# Patient Record
Sex: FEMALE | Race: BLACK OR AFRICAN AMERICAN | NOT HISPANIC OR LATINO | Employment: FULL TIME | URBAN - METROPOLITAN AREA
[De-identification: names, ages, dates, MRNs, and addresses within clinical notes are randomized per-mention and may not be internally consistent; named-entity substitution may affect disease eponyms.]

---

## 2023-12-16 NOTE — PROGRESS NOTES
Subjective   Josselyn Mcarthur is a 28 y.o.  at 9w1d with a working estimated date of delivery of 2024, by Last Menstrual Period who presents for an initial prenatal visit.     Patient currently experiencing:  nausea, not vomited yet, she eat and her symptoms normally improve, would like medication    Bleeding or cramping since LMP: no    Ultrasound completed this pregnancy: No    Taking prenatal vitamin: No, amenable to rx being sent    Last pap: 3/3/22 WNL     Patient has a hx of cHTN; was on amlodipine 5mg daily last pregnancy. Not currently on medicaiton.     Postpartum Depression: Not on file        OB History    Para Term  AB Living   2 1 1     1   SAB IAB Ectopic Multiple Live Births           1      # Outcome Date GA Lbr José Miguel/2nd Weight Sex Delivery Anes PTL Lv   2 Current            1 Term 22 38w3d   F CS-LTranv   ABA      Complications: Hemorrhage     Prior pregnancy complications:  none  History of hypertension:  Yes, chronic hypertension    Past Medical History:   Diagnosis Date    Asthma     well controlled, denies hospitalization/intubation    Chronic hypertension       Past Surgical History:   Procedure Laterality Date     SECTION, LOW TRANSVERSE  2022    NRFHT remote from delivery      Social History     Tobacco Use    Smoking status: Never    Smokeless tobacco: Never   Vaping Use    Vaping Use: Never used   Substance Use Topics    Alcohol use: Not Currently    Drug use: Not Currently     Types: Marijuana        Objective   Physical Exam  Weight: 122 kg (268 lb)  Pregravid BMI: 43.28  BP: 129/83 (pluse-102)    Physical Exam  Constitutional:       Appearance: Normal appearance.   HENT:      Head: Normocephalic.   Cardiovascular:      Rate and Rhythm: Normal rate and regular rhythm.      Heart sounds: Normal heart sounds.   Pulmonary:      Effort: Pulmonary effort is normal.      Breath sounds: Normal breath sounds.   Chest:   Breasts:     Right: Normal.      Left:  Normal.   Genitourinary:     Vagina: Normal.      Cervix: Normal.   Skin:     General: Skin is warm and dry.   Neurological:      Mental Status: She is alert.   Psychiatric:         Mood and Affect: Mood normal.         Behavior: Behavior normal.         Thought Content: Thought content normal.         Judgment: Judgment normal.         Problem List Items Addressed This Visit       BMI 40.0-44.9, adult (CMS/[facility])    Overview     Pre-preg BMI 43  A1C at new OB:   Fetal surveillance BMI >40 at NOB:  Growth US at 30 and 36 wks  BPP or NST weekly 34 wks to delivery    Timing of delivery: 39 0/7 - 39 6/7 wks  *BMI >= 50 at any time in pregnancy: Deliver at Level 4           Supervision of other high risk pregnancy, antepartum    Overview     Desired provider in labor: [] CNM  [] Physician  [] Dated by:   [x] Initial BMI: 43  [] Prenatal Labs:   [] Rh status:  [] Genetic Screening:    [] Baby ASA    [] Anatomy US:  [] Fetal Sex:   [] Patient added to BirthTracks  [] 1hr GCT at 24-28wks:  [] 3 hr GTT (if indicated):  [] Rhogam (if indicated):   [] Fetal Surveillance (if indicated):    [] Tdap (27-36wks):  [] Flu Shot:  [] COVID vaccine:     [] Breastfeeding:  [] Pain management during labor:   [] Postpartum Birth control method:     [] GBS at 36 wks:  [] 39 weeks discussion of IOL vs. Expectant management:  [] Mode of delivery:              Chronic hypertension    Overview     cHTN was on on amlodipine 5mg daily with  birth  HELLP labs ordered at new OB  Normotensive    Growth US at 30 and 36 wks  Timing of delivery: 38 0/7 - 39 6/7  Expectant management beyond 39 0/7 weeks only with careful consideration of risks/benefits & with appropriate surveillance           History of postpartum hemorrhage    Overview     : 1850ml EBL, TXA/cyto          Hx of  section    Overview     LTCS  due to NRFHT remote from birth           Other Visit Diagnoses       9 weeks gestation of pregnancy    -  Primary    Relevant  Medications    pyridoxine (Vitamin B-6) 25 mg tablet    doxylamine (Unisom, doxylamine,) 25 mg tablet    prenatal vitamin, iron-folic, 27 mg iron-800 mcg folic acid tablet    Other Relevant Orders    C. Trachomatis / N. Gonorrhoeae, Amplified Detection    CBC Anemia Panel With Reflex,Pregnancy    Hemoglobin A1C    Hemoglobin Identification with Path Review    Hepatitis B Surface Antigen    Hepatitis C Antibody    HIV 1/2 Antigen/Antibody Screen with Reflex to Confirmation    Rubella Antibody, IgG    Syphilis Screen with Reflex    Trichomonas vaginalis, Nucleic Acid Detection    Type And Screen    Urine Culture    Varicella Zoster Antibody, IgG    Comprehensive Metabolic Panel    Protein, Urine Random    Uric Acid    US MAC OB imaging order            Plan   NOB plan: New OB resources provided and reviewed with particular attention to dietary, travel, and medication restrictions  Oriented to practice, CNM vs. MD care  Reviewed IOM recommendations for weight gain given pt's BMI: 11-20 pounds (BMI greater than or equal to 30)  Reviewed bleeding precautions, warning signs, when to call provider; phone number provided  Discussed bASA for PEC prophylaxis  The following Rx were sent to pharmacy: PNV, unisom, B6  Routine NOB labs ordered  Additional labs added: HELLP and A1C   Discussed Centering Pregnancy with patient, patient will think about it   Nuchal Translucency ultrasound ordered  Return in 4 weeks for routine prenatal care  Reviewed reasons to call CNM on-call: vaginal bleeding, strong pelvic pain, or any questions/concerns  *next visit: ASA 81mg, Centering?     PEPE Vuong

## 2023-12-18 ENCOUNTER — LAB (OUTPATIENT)
Dept: LAB | Facility: LAB | Age: 28
End: 2023-12-18
Payer: MEDICAID

## 2023-12-18 ENCOUNTER — ROUTINE PRENATAL (OUTPATIENT)
Dept: OBSTETRICS AND GYNECOLOGY | Facility: CLINIC | Age: 28
End: 2023-12-18
Payer: MEDICAID

## 2023-12-18 VITALS — SYSTOLIC BLOOD PRESSURE: 129 MMHG | WEIGHT: 268 LBS | BODY MASS INDEX: 43.26 KG/M2 | DIASTOLIC BLOOD PRESSURE: 83 MMHG

## 2023-12-18 DIAGNOSIS — I10 CHRONIC HYPERTENSION: ICD-10-CM

## 2023-12-18 DIAGNOSIS — Z87.59 HISTORY OF POSTPARTUM HEMORRHAGE: ICD-10-CM

## 2023-12-18 DIAGNOSIS — Z3A.09 9 WEEKS GESTATION OF PREGNANCY (HHS-HCC): ICD-10-CM

## 2023-12-18 DIAGNOSIS — Z98.891 HX OF CESAREAN SECTION: ICD-10-CM

## 2023-12-18 DIAGNOSIS — O09.899 SUPERVISION OF OTHER HIGH RISK PREGNANCY, ANTEPARTUM (HHS-HCC): ICD-10-CM

## 2023-12-18 DIAGNOSIS — Z3A.09 9 WEEKS GESTATION OF PREGNANCY (HHS-HCC): Primary | ICD-10-CM

## 2023-12-18 LAB
ABO GROUP (TYPE) IN BLOOD: NORMAL
ALBUMIN SERPL BCP-MCNC: 4.3 G/DL (ref 3.4–5)
ALP SERPL-CCNC: 48 U/L (ref 33–110)
ALT SERPL W P-5'-P-CCNC: 18 U/L (ref 7–45)
ANION GAP SERPL CALC-SCNC: 12 MMOL/L (ref 10–20)
ANTIBODY SCREEN: NORMAL
AST SERPL W P-5'-P-CCNC: 9 U/L (ref 9–39)
BILIRUB SERPL-MCNC: 0.3 MG/DL (ref 0–1.2)
BUN SERPL-MCNC: 12 MG/DL (ref 6–23)
CALCIUM SERPL-MCNC: 9.6 MG/DL (ref 8.6–10.6)
CHLORIDE SERPL-SCNC: 105 MMOL/L (ref 98–107)
CO2 SERPL-SCNC: 24 MMOL/L (ref 21–32)
CREAT SERPL-MCNC: 0.63 MG/DL (ref 0.5–1.05)
ERYTHROCYTE [DISTWIDTH] IN BLOOD BY AUTOMATED COUNT: 15.4 % (ref 11.5–14.5)
EST. AVERAGE GLUCOSE BLD GHB EST-MCNC: 114 MG/DL
GFR SERPL CREATININE-BSD FRML MDRD: >90 ML/MIN/1.73M*2
GLUCOSE SERPL-MCNC: 92 MG/DL (ref 74–99)
HBA1C MFR BLD: 5.6 %
HBV SURFACE AG SERPL QL IA: NONREACTIVE
HCT VFR BLD AUTO: 35.9 % (ref 36–46)
HCV AB SER QL: NONREACTIVE
HGB BLD-MCNC: 11.4 G/DL (ref 12–16)
HIV 1+2 AB+HIV1 P24 AG SERPL QL IA: NONREACTIVE
MCH RBC QN AUTO: 26.1 PG (ref 26–34)
MCHC RBC AUTO-ENTMCNC: 31.8 G/DL (ref 32–36)
MCV RBC AUTO: 82 FL (ref 80–100)
NRBC BLD-RTO: 0 /100 WBCS (ref 0–0)
PLATELET # BLD AUTO: 300 X10*3/UL (ref 150–450)
POTASSIUM SERPL-SCNC: 4 MMOL/L (ref 3.5–5.3)
PROT SERPL-MCNC: 7.4 G/DL (ref 6.4–8.2)
RBC # BLD AUTO: 4.36 X10*6/UL (ref 4–5.2)
REFLEX ADDED, ANEMIA PANEL: NORMAL
RH FACTOR (ANTIGEN D): NORMAL
RUBV IGG SERPL IA-ACNC: 1.7 IA
RUBV IGG SERPL QL IA: POSITIVE
SODIUM SERPL-SCNC: 137 MMOL/L (ref 136–145)
T PALLIDUM AB SER QL: NONREACTIVE
URATE SERPL-MCNC: 5.2 MG/DL (ref 2.3–6.7)
VARICELLA ZOSTER IGG INDEX: 0.2 IA
VZV IGG SER QL IA: NEGATIVE
WBC # BLD AUTO: 8.7 X10*3/UL (ref 4.4–11.3)

## 2023-12-18 PROCEDURE — 80053 COMPREHEN METABOLIC PANEL: CPT

## 2023-12-18 PROCEDURE — 86787 VARICELLA-ZOSTER ANTIBODY: CPT

## 2023-12-18 PROCEDURE — 83020 HEMOGLOBIN ELECTROPHORESIS: CPT | Performed by: ADVANCED PRACTICE MIDWIFE

## 2023-12-18 PROCEDURE — 36415 COLL VENOUS BLD VENIPUNCTURE: CPT

## 2023-12-18 PROCEDURE — 99214 OFFICE O/P EST MOD 30 MIN: CPT | Mod: TH | Performed by: ADVANCED PRACTICE MIDWIFE

## 2023-12-18 PROCEDURE — 86317 IMMUNOASSAY INFECTIOUS AGENT: CPT

## 2023-12-18 PROCEDURE — 87661 TRICHOMONAS VAGINALIS AMPLIF: CPT | Mod: 91 | Performed by: ADVANCED PRACTICE MIDWIFE

## 2023-12-18 PROCEDURE — 86850 RBC ANTIBODY SCREEN: CPT

## 2023-12-18 PROCEDURE — 87086 URINE CULTURE/COLONY COUNT: CPT | Performed by: ADVANCED PRACTICE MIDWIFE

## 2023-12-18 PROCEDURE — 86901 BLOOD TYPING SEROLOGIC RH(D): CPT

## 2023-12-18 PROCEDURE — 84550 ASSAY OF BLOOD/URIC ACID: CPT

## 2023-12-18 PROCEDURE — 83036 HEMOGLOBIN GLYCOSYLATED A1C: CPT

## 2023-12-18 PROCEDURE — 85027 COMPLETE CBC AUTOMATED: CPT

## 2023-12-18 PROCEDURE — 86780 TREPONEMA PALLIDUM: CPT

## 2023-12-18 PROCEDURE — 99214 OFFICE O/P EST MOD 30 MIN: CPT | Performed by: ADVANCED PRACTICE MIDWIFE

## 2023-12-18 PROCEDURE — 86900 BLOOD TYPING SEROLOGIC ABO: CPT

## 2023-12-18 PROCEDURE — 87389 HIV-1 AG W/HIV-1&-2 AB AG IA: CPT

## 2023-12-18 PROCEDURE — 86803 HEPATITIS C AB TEST: CPT

## 2023-12-18 PROCEDURE — 87800 DETECT AGNT MULT DNA DIREC: CPT | Performed by: ADVANCED PRACTICE MIDWIFE

## 2023-12-18 PROCEDURE — 87340 HEPATITIS B SURFACE AG IA: CPT

## 2023-12-18 PROCEDURE — 83021 HEMOGLOBIN CHROMOTOGRAPHY: CPT

## 2023-12-18 RX ORDER — PYRIDOXINE HCL (VITAMIN B6) 25 MG
25 TABLET ORAL 3 TIMES DAILY PRN
Qty: 30 TABLET | Refills: 2 | Status: SHIPPED | OUTPATIENT
Start: 2023-12-18 | End: 2024-01-17

## 2023-12-18 ASSESSMENT — ENCOUNTER SYMPTOMS
CONSTITUTIONAL NEGATIVE: 0
GASTROINTESTINAL NEGATIVE: 0
CARDIOVASCULAR NEGATIVE: 0
MUSCULOSKELETAL NEGATIVE: 0
HEMATOLOGIC/LYMPHATIC NEGATIVE: 0
EYES NEGATIVE: 0
ENDOCRINE NEGATIVE: 0
RESPIRATORY NEGATIVE: 0
NEUROLOGICAL NEGATIVE: 0
ALLERGIC/IMMUNOLOGIC NEGATIVE: 0
PSYCHIATRIC NEGATIVE: 0

## 2023-12-19 PROBLEM — Z28.39 MATERNAL VARICELLA, NON-IMMUNE (HHS-HCC): Status: ACTIVE | Noted: 2023-12-19

## 2023-12-19 PROBLEM — O09.899 MATERNAL VARICELLA, NON-IMMUNE (HHS-HCC): Status: ACTIVE | Noted: 2023-12-19

## 2023-12-19 LAB
BACTERIA UR CULT: NO GROWTH
C TRACH RRNA SPEC QL NAA+PROBE: NEGATIVE
HEMOGLOBIN A2: 2.5 % (ref 2–3.5)
HEMOGLOBIN A: 97.1 % (ref 95.8–98)
HEMOGLOBIN F: 0.4 % (ref 0–2)
HEMOGLOBIN IDENTIFICATION INTERPRETATION: NORMAL
N GONORRHOEA DNA SPEC QL PROBE+SIG AMP: NEGATIVE
PATH REVIEW-HGB IDENTIFICATION: NORMAL
T VAGINALIS RRNA SPEC QL NAA+PROBE: NEGATIVE

## 2024-01-12 ENCOUNTER — DOCUMENTATION (OUTPATIENT)
Dept: OBSTETRICS AND GYNECOLOGY | Facility: CLINIC | Age: 29
End: 2024-01-12
Payer: MEDICAID

## 2024-01-12 ENCOUNTER — PROCEDURE VISIT (OUTPATIENT)
Dept: RADIOLOGY | Facility: CLINIC | Age: 29
End: 2024-01-12
Payer: MEDICAID

## 2024-01-12 DIAGNOSIS — Z3A.09 9 WEEKS GESTATION OF PREGNANCY (HHS-HCC): ICD-10-CM

## 2024-01-12 PROCEDURE — 76813 OB US NUCHAL MEAS 1 GEST: CPT

## 2024-01-13 NOTE — PROGRESS NOTES
Subjective   Ora Beth Mcarthur is a 28 y.o.  at 12w3d with a working estimated date of delivery of 2024, by Ultrasound who presents for a routine prenatal visit.     She denies vaginal bleeding, abdominal pain, leakage of fluid.     Patient declines genetic testing. Patient declines flu shot. Declines Centering.     Objective   Physical Exam  Weight: 123 kg (271 lb 9.6 oz), Pregravid BMI: 43.28  Expected Total Weight Gain: 5 kg (11 lb)-9 kg (19 lb)   BP: 121/80 (Pluse-105)  Fetal Heart Rate: 160      Problem List Items Addressed This Visit       BMI 40.0-44.9, adult (CMS/[facility])    Overview     Pre-preg BMI 43  A1C at new OB:   Fetal surveillance BMI >40 at NOB:  Growth US at 30 and 36 wks  BPP or NST weekly 34 wks to delivery    Timing of delivery: 39 0/7 - 39 6/7 wks  *BMI >= 50 at any time in pregnancy: Deliver at Level 4           Supervision of other high risk pregnancy, antepartum    Overview     Desired provider in labor: [] CNM  [] Physician  [x] Dated by:  12w US  [x] Initial BMI: 43  [x] Prenatal Labs:  WNL except varicella nonimmue  [x] Rh status: B+  [x] Genetic Screening:  declined   [x] Baby ASA: prescribed 1/15    [] Anatomy US:  [] Fetal Sex:   [] Patient added to BirthTracks  [] 1hr GCT at 24-28wks:  [] 3 hr GTT (if indicated):  [] Fetal Surveillance (if indicated):    [] Tdap (27-36wks):  [] Flu Shot:  [] COVID vaccine:     [] Breastfeeding:  [] Pain management during labor:   [] Postpartum Birth control method:     [] GBS at 36 wks:  [] 39 weeks discussion of IOL vs. Expectant management:  [] Mode of delivery:              History of postpartum hemorrhage    Overview     : 1850ml EBL, TXA/cyto          Hx of  section    Overview     LTCS  due to NRFHT remote from birth          Maternal varicella, non-immune    Overview     Offer varivax PP          Other Visit Diagnoses       12 weeks gestation of pregnancy    -  Primary    Relevant Medications    aspirin 81 mg chewable  tablet    Influenza vaccination declined                -Start bASA for PEC prophylaxis  -Anatomy US ordered  -Reviewed reasons to call CNM on-call: vaginal bleeding, loss of fluid, severe pelvic pain, or any questions/concerns  -RTC in 4 weeks or prn    TATE Vuong-SALVADORM

## 2024-01-15 ENCOUNTER — ROUTINE PRENATAL (OUTPATIENT)
Dept: OBSTETRICS AND GYNECOLOGY | Facility: CLINIC | Age: 29
End: 2024-01-15
Payer: MEDICAID

## 2024-01-15 VITALS — WEIGHT: 271.6 LBS | BODY MASS INDEX: 43.84 KG/M2 | DIASTOLIC BLOOD PRESSURE: 80 MMHG | SYSTOLIC BLOOD PRESSURE: 121 MMHG

## 2024-01-15 DIAGNOSIS — Z98.891 HX OF CESAREAN SECTION: ICD-10-CM

## 2024-01-15 DIAGNOSIS — Z87.59 HISTORY OF POSTPARTUM HEMORRHAGE: ICD-10-CM

## 2024-01-15 DIAGNOSIS — Z28.21 INFLUENZA VACCINATION DECLINED: ICD-10-CM

## 2024-01-15 DIAGNOSIS — Z3A.12 12 WEEKS GESTATION OF PREGNANCY (HHS-HCC): Primary | ICD-10-CM

## 2024-01-15 DIAGNOSIS — O09.899 SUPERVISION OF OTHER HIGH RISK PREGNANCY, ANTEPARTUM (HHS-HCC): ICD-10-CM

## 2024-01-15 DIAGNOSIS — Z28.39 MATERNAL VARICELLA, NON-IMMUNE (HHS-HCC): ICD-10-CM

## 2024-01-15 DIAGNOSIS — O09.899 MATERNAL VARICELLA, NON-IMMUNE (HHS-HCC): ICD-10-CM

## 2024-01-15 PROCEDURE — 99213 OFFICE O/P EST LOW 20 MIN: CPT | Performed by: ADVANCED PRACTICE MIDWIFE

## 2024-01-15 PROCEDURE — 99213 OFFICE O/P EST LOW 20 MIN: CPT | Mod: TH | Performed by: ADVANCED PRACTICE MIDWIFE

## 2024-01-15 RX ORDER — NAPROXEN SODIUM 220 MG/1
81 TABLET, FILM COATED ORAL NIGHTLY
Qty: 90 TABLET | Refills: 3 | Status: SHIPPED | OUTPATIENT
Start: 2024-01-15 | End: 2024-04-14

## 2024-01-15 ASSESSMENT — ENCOUNTER SYMPTOMS
ALLERGIC/IMMUNOLOGIC NEGATIVE: 0
HEMATOLOGIC/LYMPHATIC NEGATIVE: 0
ENDOCRINE NEGATIVE: 0
MUSCULOSKELETAL NEGATIVE: 0
PSYCHIATRIC NEGATIVE: 0
CONSTITUTIONAL NEGATIVE: 0
NEUROLOGICAL NEGATIVE: 0
GASTROINTESTINAL NEGATIVE: 0
EYES NEGATIVE: 0
CARDIOVASCULAR NEGATIVE: 0
RESPIRATORY NEGATIVE: 0

## 2024-02-13 ENCOUNTER — ROUTINE PRENATAL (OUTPATIENT)
Dept: OBSTETRICS AND GYNECOLOGY | Facility: CLINIC | Age: 29
End: 2024-02-13
Payer: MEDICAID

## 2024-02-13 VITALS — DIASTOLIC BLOOD PRESSURE: 84 MMHG | SYSTOLIC BLOOD PRESSURE: 131 MMHG | BODY MASS INDEX: 43.45 KG/M2 | WEIGHT: 269.2 LBS

## 2024-02-13 DIAGNOSIS — Z3A.16 16 WEEKS GESTATION OF PREGNANCY (HHS-HCC): Primary | ICD-10-CM

## 2024-02-13 DIAGNOSIS — Z98.891 HX OF CESAREAN SECTION: ICD-10-CM

## 2024-02-13 DIAGNOSIS — Z87.59 HISTORY OF POSTPARTUM HEMORRHAGE: ICD-10-CM

## 2024-02-13 DIAGNOSIS — I10 CHRONIC HYPERTENSION: ICD-10-CM

## 2024-02-13 LAB
CREAT UR-MCNC: 189.1 MG/DL (ref 20–320)
PROT UR-ACNC: 14 MG/DL (ref 5–24)
PROT/CREAT UR: 0.07 MG/MG CREAT (ref 0–0.17)

## 2024-02-13 PROCEDURE — 99214 OFFICE O/P EST MOD 30 MIN: CPT | Mod: TH | Performed by: ADVANCED PRACTICE MIDWIFE

## 2024-02-13 PROCEDURE — 82570 ASSAY OF URINE CREATININE: CPT | Performed by: ADVANCED PRACTICE MIDWIFE

## 2024-02-13 PROCEDURE — 99214 OFFICE O/P EST MOD 30 MIN: CPT | Performed by: ADVANCED PRACTICE MIDWIFE

## 2024-02-13 ASSESSMENT — ENCOUNTER SYMPTOMS
CONSTITUTIONAL NEGATIVE: 0
PSYCHIATRIC NEGATIVE: 0
GASTROINTESTINAL NEGATIVE: 0
RESPIRATORY NEGATIVE: 0
HEMATOLOGIC/LYMPHATIC NEGATIVE: 0
MUSCULOSKELETAL NEGATIVE: 0
ENDOCRINE NEGATIVE: 0
ALLERGIC/IMMUNOLOGIC NEGATIVE: 0
NEUROLOGICAL NEGATIVE: 0
CARDIOVASCULAR NEGATIVE: 0
EYES NEGATIVE: 0

## 2024-02-13 NOTE — PROGRESS NOTES
Assessment/Plan   Problem List Items Addressed This Visit             ICD-10-CM       Gynecologic and Obstetric Problems    History of postpartum hemorrhage Z87.59    Hx of  section Z98.891       Other    BMI 40.0-44.9, adult (CMS/[facility]) Z68.41    Chronic hypertension I10    Relevant Orders    Protein, Urine Random     Other Visit Diagnoses         Codes    16 weeks gestation of pregnancy    -  Primary Z3A.16            Labs reviewed.  Anatomy US scheduled  Taking baby asa BP cuff at home  Declined genetics    Reviewed s/sx of PTL, warning signs, fetal movement counts, and when to call provider  Follow up in 4 weeks for a routine prenatal visit.    PEPE Gonsales    Subjective     Orrios Beth Mcarthur is a 28 y.o.  at 16w4d with a working estimated date of delivery of 2024, by Ultrasound who presents for a routine prenatal visit. She denies vaginal bleeding, leakage of fluid, decreased fetal movements, or contractions.    Her pregnancy is complicated by:  Pregnancy Problems (from 23 to present)       Problem Noted Resolved    Maternal varicella, non-immune 2023 by PEPE Vuong No    Overview Signed 2023  8:05 AM by PEPE Vuong     Offer varivax PP         BMI 40.0-44.9, adult (CMS/[facility]) 2023 by PEPE Vuong No    Overview Addendum 2023  3:18 PM by PEPE Vuong     Pre-preg BMI 43  A1C at new OB:   Fetal surveillance BMI >40 at NOB:  Growth US at 30 and 36 wks  BPP or NST weekly 34 wks to delivery    Timing of delivery: 39 0/7 - 39 6/7 wks  *BMI >= 50 at any time in pregnancy: Deliver at Level 4           Supervision of other high risk pregnancy, antepartum 2023 by PEPE Vuong No    Overview Addendum 1/15/2024  1:18 PM by PEPE Vuong     Desired provider in labor: [] CNM  [] Physician  [x] Dated by:  12w US  [x] Initial BMI:  "43  [x] Prenatal Labs:  WNL except varicella nonimmue  [x] Rh status: B+  [x] Genetic Screening:  declined   [x] Baby ASA: prescribed 1/15    [] Anatomy US:  [] Fetal Sex:   [] Patient added to BirthTracks  [] 1hr GCT at 24-28wks:  [] 3 hr GTT (if indicated):  [] Fetal Surveillance (if indicated):    [] Tdap (27-36wks):  [x] Flu Shot: declined 1/15  [] COVID vaccine:     [] Breastfeeding:  [] Pain management during labor:   [] Postpartum Birth control method:     [] GBS at 36 wks:  [] 39 weeks discussion of IOL vs. Expectant management:  [] Mode of delivery:              History of postpartum hemorrhage 2023 by PEPE Vuong No    Overview Signed 2023  3:55 PM by PEPE Vuong     : 1850ml EBL, TXA/cyto          Hx of  section 2023 by PEPE Vuong No    Overview Signed 2023  3:56 PM by PEPE Vuong     LTCS  due to NRFHT remote from birth                   Objective   Physical Exam  Weight: 122 kg (269 lb 3.2 oz)  Expected Total Weight Gain: 5 kg (11 lb)-9 kg (19 lb)   Pregravid BMI: 43.28  BP: 131/84 (Pluse-92)  Fetal Heart Rate: 165      Postpartum Depression: Not on file       Prenatal Labs  Lab Results   Component Value Date    HGB 11.4 (L) 2023    HCT 35.9 (L) 2023    ABO B 2023    HEPBSAG Nonreactive 2023     No results found for: \"GLUC1P\", \"GL3\"     Imaging  The most recent ultrasound was performed on The most recent ultrasound study is not finalized with a study GA of The most recent ultrasound study is not finalized and EFW of The most recent ultrasound study is not finalized.  The most recent ultrasound study is not finalized  The most recent ultrasound study is not finalized  "

## 2024-02-28 ENCOUNTER — TELEPHONE (OUTPATIENT)
Dept: OBSTETRICS AND GYNECOLOGY | Facility: CLINIC | Age: 29
End: 2024-02-28

## 2024-03-08 ENCOUNTER — HOSPITAL ENCOUNTER (OUTPATIENT)
Dept: RADIOLOGY | Facility: CLINIC | Age: 29
Discharge: HOME | End: 2024-03-08
Payer: MEDICAID

## 2024-03-08 DIAGNOSIS — Z36.3 ENCOUNTER FOR ANTENATAL SCREENING FOR MALFORMATIONS (HHS-HCC): ICD-10-CM

## 2024-03-08 PROCEDURE — 76811 OB US DETAILED SNGL FETUS: CPT | Performed by: OBSTETRICS & GYNECOLOGY

## 2024-03-08 PROCEDURE — 76811 OB US DETAILED SNGL FETUS: CPT

## 2024-03-14 ENCOUNTER — ROUTINE PRENATAL (OUTPATIENT)
Dept: OBSTETRICS AND GYNECOLOGY | Facility: CLINIC | Age: 29
End: 2024-03-14
Payer: MEDICAID

## 2024-03-14 VITALS — DIASTOLIC BLOOD PRESSURE: 84 MMHG | WEIGHT: 275.4 LBS | BODY MASS INDEX: 44.45 KG/M2 | SYSTOLIC BLOOD PRESSURE: 134 MMHG

## 2024-03-14 DIAGNOSIS — Z87.59 HISTORY OF POSTPARTUM HEMORRHAGE: ICD-10-CM

## 2024-03-14 DIAGNOSIS — O09.899 SUPERVISION OF OTHER HIGH RISK PREGNANCY, ANTEPARTUM (HHS-HCC): ICD-10-CM

## 2024-03-14 DIAGNOSIS — O09.899 MATERNAL VARICELLA, NON-IMMUNE (HHS-HCC): ICD-10-CM

## 2024-03-14 DIAGNOSIS — Z28.39 MATERNAL VARICELLA, NON-IMMUNE (HHS-HCC): ICD-10-CM

## 2024-03-14 DIAGNOSIS — Z3A.20 20 WEEKS GESTATION OF PREGNANCY (HHS-HCC): Primary | ICD-10-CM

## 2024-03-14 DIAGNOSIS — Z98.891 HX OF CESAREAN SECTION: ICD-10-CM

## 2024-03-14 PROCEDURE — 99213 OFFICE O/P EST LOW 20 MIN: CPT | Performed by: ADVANCED PRACTICE MIDWIFE

## 2024-03-14 PROCEDURE — 99213 OFFICE O/P EST LOW 20 MIN: CPT | Mod: TH | Performed by: ADVANCED PRACTICE MIDWIFE

## 2024-03-14 NOTE — PROGRESS NOTES
Assessment/Plan   Problem List Items Addressed This Visit             ICD-10-CM       Ob-Gyn Problems    History of postpartum hemorrhage Z87.59    Hx of  section Z98.891    Maternal varicella, non-immune O09.899, Z28.39    Supervision of other high risk pregnancy, antepartum O09.899       Other    BMI 40.0-44.9, adult (CMS/[facility]) Z68.41     Other Visit Diagnoses         Codes    20 weeks gestation of pregnancy    -  Primary Z3A.20          Labs reviewed.  Anatomy US scheduled  Growth US scheduled for 30WK  NEED MD CONSULT TO TOLAC    Reviewed s/sx of PTL, warning signs, fetal movement counts, and when to call provider  Follow up in 4 weeks for a routine prenatal visit.    PEPE Gonsales     Josselyn Mcarthur is a 28 y.o.  at 20w6d with a working estimated date of delivery of 2024, by Ultrasound who presents for a routine prenatal visit. She denies vaginal bleeding, leakage of fluid, decreased fetal movements, or contractions.    Her pregnancy is complicated by:  Pregnancy Problems (from 23 to present)       Problem Noted Resolved    Maternal varicella, non-immune 2023 by PEPE Vuong No    Priority:  Medium      Overview Signed 2023  8:05 AM by PEPE Vuong     Offer varivax PP         BMI 40.0-44.9, adult (CMS/[facility]) 2023 by PEPE Vuong No    Priority:  Medium      Overview Addendum 2023  3:18 PM by PEPE Vuong     Pre-preg BMI 43  A1C at new OB:   Fetal surveillance BMI >40 at NOB:  Growth US at 30 and 36 wks  BPP or NST weekly 34 wks to delivery    Timing of delivery: 39 0/7 - 39 6/7 wks  *BMI >= 50 at any time in pregnancy: Deliver at Level 4           Supervision of other high risk pregnancy, antepartum 2023 by PEPE Vuong No    Priority:  Medium      Overview Addendum 1/15/2024  1:18 PM by PEPE Vuong      "Desired provider in labor: [x] CNM  [] Physician  [x] Dated by:  12w US  [x] Initial BMI: 43  [x] Prenatal Labs:  WNL except varicella nonimmue  [x] Rh status: B+  [x] Genetic Screening:  declined   [x] Baby ASA: prescribed 1/15    [x] Anatomy US: incomplete  [x] Fetal Sex: girl  [] Patient added to BirthTracks  [] 1hr GCT at 24-28wks:  [] 3 hr GTT (if indicated):  [] Fetal Surveillance (if indicated):    [] Tdap (27-36wks):  [x] Flu Shot: declined 1/15  [] COVID vaccine:     [] Breastfeeding:  [] Pain management during labor:   [] Postpartum Birth control method:     [] GBS at 36 wks:  [] 39 weeks discussion of IOL vs. Expectant management:  [] Mode of delivery:              History of postpartum hemorrhage 2023 by PEPE Vuong No    Priority:  Medium      Overview Signed 2023  3:55 PM by PEPE Vuong     : 1850ml EBL, TXA/cyto          Hx of  section 2023 by PEPE Vuong No    Priority:  Medium      Overview Addendum 2024 12:08 PM by PEPE Gonsales     LTCS  due to NRFHT remote from birth     MFMU 48.6% NEEDS MD consult based on 268lb pre-preg weight                  Objective   Physical Exam  Weight: 125 kg (275 lb 6.4 oz)  Expected Total Weight Gain: 5 kg (11 lb)-9 kg (19 lb)   Pregravid BMI: 43.28  BP: 134/84 ()  Fetal Heart Rate: 155      Postpartum Depression: Not on file       Prenatal Labs  Lab Results   Component Value Date    HGB 11.4 (L) 2023    HCT 35.9 (L) 2023    ABO B 2023    HEPBSAG Nonreactive 2023     No results found for: \"GLUC1P\", \"GL3\"     Imaging  The most recent ultrasound was performed on The most recent ultrasound study is not finalized with a study GA of The most recent ultrasound study is not finalized and EFW of The most recent ultrasound study is not finalized.  The most recent ultrasound study is not finalized  The most recent ultrasound study " is not finalized

## 2024-03-22 ENCOUNTER — HOSPITAL ENCOUNTER (OUTPATIENT)
Dept: RADIOLOGY | Facility: CLINIC | Age: 29
Discharge: HOME | End: 2024-03-22
Payer: MEDICAID

## 2024-03-22 DIAGNOSIS — Z36.3 ENCOUNTER FOR ANTENATAL SCREENING FOR MALFORMATIONS (HHS-HCC): ICD-10-CM

## 2024-03-22 PROCEDURE — 76816 OB US FOLLOW-UP PER FETUS: CPT | Performed by: STUDENT IN AN ORGANIZED HEALTH CARE EDUCATION/TRAINING PROGRAM

## 2024-03-22 PROCEDURE — 76816 OB US FOLLOW-UP PER FETUS: CPT

## 2024-04-11 ENCOUNTER — LAB (OUTPATIENT)
Dept: LAB | Facility: LAB | Age: 29
End: 2024-04-11
Payer: MEDICAID

## 2024-04-11 ENCOUNTER — ROUTINE PRENATAL (OUTPATIENT)
Dept: OBSTETRICS AND GYNECOLOGY | Facility: CLINIC | Age: 29
End: 2024-04-11
Payer: MEDICAID

## 2024-04-11 VITALS — WEIGHT: 279.8 LBS | DIASTOLIC BLOOD PRESSURE: 77 MMHG | BODY MASS INDEX: 45.16 KG/M2 | SYSTOLIC BLOOD PRESSURE: 122 MMHG

## 2024-04-11 DIAGNOSIS — I10 CHRONIC HYPERTENSION: Primary | ICD-10-CM

## 2024-04-11 DIAGNOSIS — Z3A.09 9 WEEKS GESTATION OF PREGNANCY (HHS-HCC): ICD-10-CM

## 2024-04-11 DIAGNOSIS — Z87.59 HISTORY OF POSTPARTUM HEMORRHAGE: ICD-10-CM

## 2024-04-11 DIAGNOSIS — O09.899 SUPERVISION OF OTHER HIGH RISK PREGNANCY, ANTEPARTUM (HHS-HCC): ICD-10-CM

## 2024-04-11 DIAGNOSIS — Z28.39 MATERNAL VARICELLA, NON-IMMUNE (HHS-HCC): ICD-10-CM

## 2024-04-11 DIAGNOSIS — O09.899 MATERNAL VARICELLA, NON-IMMUNE (HHS-HCC): ICD-10-CM

## 2024-04-11 DIAGNOSIS — Z98.891 HX OF CESAREAN SECTION: ICD-10-CM

## 2024-04-11 LAB
CREAT UR-MCNC: 153.3 MG/DL (ref 20–320)
ERYTHROCYTE [DISTWIDTH] IN BLOOD BY AUTOMATED COUNT: 14.5 % (ref 11.5–14.5)
FERRITIN SERPL-MCNC: 26 NG/ML
FOLATE SERPL-MCNC: >24 NG/ML
GLUCOSE 1H P 50 G GLC PO SERPL-MCNC: 103 MG/DL
HCT VFR BLD AUTO: 34.2 % (ref 36–46)
HGB BLD-MCNC: 10.7 G/DL (ref 12–16)
IRON SATN MFR SERPL: 27 %
IRON SERPL-MCNC: 133 UG/DL
MCH RBC QN AUTO: 26.8 PG (ref 26–34)
MCHC RBC AUTO-ENTMCNC: 31.3 G/DL (ref 32–36)
MCV RBC AUTO: 86 FL (ref 80–100)
NRBC BLD-RTO: 0 /100 WBCS (ref 0–0)
PLATELET # BLD AUTO: 264 X10*3/UL (ref 150–450)
PROT UR-ACNC: 20 MG/DL (ref 5–24)
PROT/CREAT UR: 0.13 MG/MG CREAT (ref 0–0.17)
RBC # BLD AUTO: 4 X10*6/UL (ref 4–5.2)
REFLEX ADDED, ANEMIA PANEL: NORMAL
TIBC SERPL-MCNC: 490 UG/DL
TREPONEMA PALLIDUM IGG+IGM AB [PRESENCE] IN SERUM OR PLASMA BY IMMUNOASSAY: NONREACTIVE
UIBC SERPL-MCNC: 357 UG/DL
VIT B12 SERPL-MCNC: 326 PG/ML
WBC # BLD AUTO: 8.4 X10*3/UL (ref 4.4–11.3)

## 2024-04-11 PROCEDURE — 86780 TREPONEMA PALLIDUM: CPT

## 2024-04-11 PROCEDURE — 84156 ASSAY OF PROTEIN URINE: CPT

## 2024-04-11 PROCEDURE — 82607 VITAMIN B-12: CPT

## 2024-04-11 PROCEDURE — 82728 ASSAY OF FERRITIN: CPT

## 2024-04-11 PROCEDURE — 85027 COMPLETE CBC AUTOMATED: CPT

## 2024-04-11 PROCEDURE — 83550 IRON BINDING TEST: CPT

## 2024-04-11 PROCEDURE — 36415 COLL VENOUS BLD VENIPUNCTURE: CPT

## 2024-04-11 PROCEDURE — 99214 OFFICE O/P EST MOD 30 MIN: CPT | Mod: GC,TH

## 2024-04-11 PROCEDURE — 82570 ASSAY OF URINE CREATININE: CPT

## 2024-04-11 PROCEDURE — 82746 ASSAY OF FOLIC ACID SERUM: CPT

## 2024-04-11 PROCEDURE — 99214 OFFICE O/P EST MOD 30 MIN: CPT

## 2024-04-11 PROCEDURE — 82947 ASSAY GLUCOSE BLOOD QUANT: CPT

## 2024-04-11 RX ORDER — PNV,CALCIUM 72/IRON/FOLIC ACID 27 MG-1 MG
TABLET ORAL
COMMUNITY
Start: 2024-03-14

## 2024-04-11 ASSESSMENT — EDINBURGH POSTNATAL DEPRESSION SCALE (EPDS)
I HAVE BLAMED MYSELF UNNECESSARILY WHEN THINGS WENT WRONG: NO, NEVER
I HAVE BEEN ANXIOUS OR WORRIED FOR NO GOOD REASON: HARDLY EVER
I HAVE BEEN ABLE TO LAUGH AND SEE THE FUNNY SIDE OF THINGS: AS MUCH AS I ALWAYS COULD
THINGS HAVE BEEN GETTING ON TOP OF ME: NO, I HAVE BEEN COPING AS WELL AS EVER
I HAVE BEEN SO UNHAPPY THAT I HAVE BEEN CRYING: NO, NEVER
I HAVE FELT SAD OR MISERABLE: NO, NOT AT ALL
I HAVE FELT SCARED OR PANICKY FOR NO GOOD REASON: NO, NOT AT ALL
I HAVE LOOKED FORWARD WITH ENJOYMENT TO THINGS: AS MUCH AS I EVER DID
TOTAL SCORE: 1
THE THOUGHT OF HARMING MYSELF HAS OCCURRED TO ME: NEVER
I HAVE BEEN SO UNHAPPY THAT I HAVE HAD DIFFICULTY SLEEPING: NOT AT ALL

## 2024-04-11 ASSESSMENT — ENCOUNTER SYMPTOMS
HEMATOLOGIC/LYMPHATIC NEGATIVE: 0
CONSTITUTIONAL NEGATIVE: 0
RESPIRATORY NEGATIVE: 0
MUSCULOSKELETAL NEGATIVE: 0
PSYCHIATRIC NEGATIVE: 0
CARDIOVASCULAR NEGATIVE: 0
ALLERGIC/IMMUNOLOGIC NEGATIVE: 0
EYES NEGATIVE: 0
ENDOCRINE NEGATIVE: 0
NEUROLOGICAL NEGATIVE: 0
GASTROINTESTINAL NEGATIVE: 0

## 2024-04-11 NOTE — PROGRESS NOTES
"Subjective   Patient ID [id number]   Josselyn Mcarthur is a 28 y.o.  at 24w6d with a working estimated date of delivery of 2024, by Ultrasound who presents for a routine prenatal visit. She denies vaginal bleeding, leakage of fluid, decreased fetal movements, or contractions.    Her pregnancy is complicated by:  - obesity class 2  - hx prior C section, post partum hemorrhage, and gestational hypertension with last pregnancy. On aspirin 81 currently  - maternal varicella nonimmune     Pt concerns today:     Objective   Vitals:    24 1415   BP: 122/77      Physical Exam  Weight: 127 kg (279 lb 12.8 oz)  Fundal Height:       Expected Total Weight Gain: 5 kg (11 lb)-9 kg (19 lb)   Pregravid BMI: 43.28      Prenatal Labs  Urine dip:  Lab Results   Component Value Date    KETONESU NEGATIVE 2017       Lab Results   Component Value Date    HGB 11.4 (L) 2023    HCT 35.9 (L) 2023    ABO B 2023    HEPBSAG Nonreactive 2023     No results found for: \"PAPPA\", \"AFP\", \"HCG\", \"ESTRIOL\", \"INHBA\"  No results found for: \"GLUF\", \"GLUT1\", \"EEFJBBY6HT\", \"[id number]\"    Imaging  The most recent ultrasound was performed on The most recent ultrasound study is not finalized with a study GA of The most recent ultrasound study is not finalized and EFW of The most recent ultrasound study is not finalized.  The most recent ultrasound study is not finalized  The most recent ultrasound study is not finalized    Assessment/Plan   .  Problem List Items Addressed This Visit             ICD-10-CM    BMI 40.0-44.9, adult (CMS/[facility]) Z68.41    Chronic hypertension - Primary I10    History of postpartum hemorrhage Z87.59    Hx of  section Z98.891    Maternal varicella, non-immune O09.899, Z28.39    Supervision of other high risk pregnancy, antepartum O09.899     -1hr gtt and 2nd trimester labs collected today          Relevant Orders    Glucose, 1 Hour Screen, Pregnancy    CBC Anemia Panel With " Reflex,Pregnancy    Syphilis Screen with Reflex     Continue prenatal vitamin.  Labs reviewed.    Follow up in 4 weeks for a routine prenatal visit.

## 2024-04-11 NOTE — PROGRESS NOTES
Subjective   Patient ID [id number]   Josselyn Mcarthur is a 28 y.o.  at 24w6d with a working estimated date of delivery of 2024, by Ultrasound who presents for a routine prenatal visit. Pt also reports intermittent abdominal cramping in RLQ, that improves with rest. She also has increased urinary frequency, Denies VB, LOF, hematuria, dysuria and feels good fetal movement.     Pt  reports a single episode of heachache with light sensitivity that occurred 2 weeks ago. Pt associates with  migraines which she has had in the past. She did not take BP during episode, it spontaneously resolved on its own. Currently denies having a  headache, CP, SOB and RUQ pain. She continues to take amlodipine 5mg daily for cHTN and baby aspirin 81mg daily       Her pregnancy is notable for:  - Class II obesity, BMI 43  - hx prior C section in s/o NRFHT, c/b PPH requiring TXA and cytotec  - cHTN, on amlodipine 5mg abram, baseline HELLP labs wnl, P:C 0.07  - maternal varicella nonimmune, for varicella postpartum     Objective   Vitals:    24 1415   BP: 122/77     Physical Exam  Physical Exam  Constitutional:       General: She is not in acute distress.     Appearance: Normal appearance. She is not ill-appearing or diaphoretic.   Cardiovascular:      Rate and Rhythm: Normal rate and regular rhythm.      Heart sounds: No murmur heard.     No friction rub. No gallop.   Pulmonary:      Effort: Pulmonary effort is normal. No respiratory distress.      Breath sounds: Normal breath sounds. No stridor.   Abdominal:      General: There is no distension.      Palpations: Abdomen is soft. There is no mass.      Tenderness: There is no abdominal tenderness.   Neurological:      Mental Status: She is alert and oriented to person, place, and time.   Psychiatric:         Mood and Affect: Mood normal.         Behavior: Behavior normal.         Thought Content: Thought content normal.         Judgment: Judgment normal.     Fundal Height:  "24cm  FHT: 151    Weight: 127 kg (279 lb 12.8 oz)    Expected Total Weight Gain: 5 kg (11 lb)-9 kg (19 lb)   Pregravid BMI: 43.28    Prenatal Labs  Urine dip:  Lab Results   Component Value Date    KETONESU NEGATIVE 12/29/2017       Lab Results   Component Value Date    HGB 11.4 (L) 12/18/2023    HCT 35.9 (L) 12/18/2023    ABO B 12/18/2023    HEPBSAG Nonreactive 12/18/2023     No results found for: \"PAPPA\", \"AFP\", \"HCG\", \"ESTRIOL\", \"INHBA\"  No results found for: \"GLUF\", \"GLUT1\", \"[id number]\", \"[id number]\"    Imaging  The most recent ultrasound was performed on The most recent ultrasound study is not finalized with a study GA of The most recent ultrasound study is not finalized and EFW of The most recent ultrasound study is not finalized.  The most recent ultrasound study is not finalized  The most recent ultrasound study is not finalized    Assessment/Plan     .  Medical Problems       Problem List       BMI 40.0-44.9, adult (CMS/[facility])    Overview Addendum 12/18/2023  3:18 PM by PEPE Vuong     Pre-preg BMI 43  A1C at new OB:   Fetal surveillance BMI >40 at NOB:  Growth US at 30 and 36 wks  BPP or NST weekly 34 wks to delivery    Timing of delivery: 39 0/7 - 39 6/7 wks  *BMI >= 50 at any time in pregnancy: Deliver at Level 4           Supervision of other high risk pregnancy, antepartum    Overview Addendum 4/11/2024  2:34 PM by Aliza Harden MD     Desired provider in labor: [] CNM  [] Physician  [x] Dated by:  12w US  [x] Initial BMI: 43  [x] Prenatal Labs:  WNL except varicella nonimmue  [x] Rh status: B+  [x] Genetic Screening:  declined   [x] Baby ASA: prescribed 1/15    [] Anatomy US:  [] Fetal Sex:   [] Patient added to BirthTracks  [x] 1hr GCT at 24-28wks: drawn 4/11   [] 3 hr GTT (if indicated):  [] Fetal Surveillance (if indicated):    [] Tdap (27-36wks):  [x] Flu Shot: declined 1/15  [] COVID vaccine:     [] Breastfeeding:  [] Pain management during labor:   [] Postpartum Birth " control method:     [] GBS at 36 wks:  [] 39 weeks discussion of IOL vs. Expectant management:  [] Mode of delivery:              Chronic hypertension    Overview Addendum 2023  8:05 AM by PEPE Vuong     cHTN was on on amlodipine 5mg daily with  birth  HELLP labs ordered at new OB; WNL   Normotensive    Growth US at 30 and 36 wks  Timing of delivery: 38 0/7 - 39 6/7  Expectant management beyond 39 0/7 weeks only with careful consideration of risks/benefits & with appropriate surveillance           History of postpartum hemorrhage    Overview Signed 2023  3:55 PM by PEPE Vuong     : 1850ml EBL, TXA/cyto          Hx of  section    Overview Addendum 2024  2:56 PM by Aliza Harden MD     LTCS  due to NRFHT remote from birth     MFMU 48.6% NEEDS MD consult based on 268lb pre-preg weight. Obtain T[facility] consent form at next visit     Will need to be TXC pRBC for induction          Maternal varicella, non-immune    Overview Signed 2023  8:05 AM by PEPE Vuong     Offer varivax PP             Follow up in 4 weeks for a routine prenatal visit    Stanley New, MS3     I saw the patient with Dr. Kidd and the medical student and  made edits within the text,    Aliza Harden MD, PGY-2

## 2024-04-15 DIAGNOSIS — D50.9 IRON DEFICIENCY ANEMIA, UNSPECIFIED IRON DEFICIENCY ANEMIA TYPE: Primary | ICD-10-CM

## 2024-05-06 ENCOUNTER — LAB (OUTPATIENT)
Dept: LAB | Facility: LAB | Age: 29
End: 2024-05-06
Payer: MEDICAID

## 2024-05-06 ENCOUNTER — ROUTINE PRENATAL (OUTPATIENT)
Dept: OBSTETRICS AND GYNECOLOGY | Facility: CLINIC | Age: 29
End: 2024-05-06
Payer: MEDICAID

## 2024-05-06 VITALS — DIASTOLIC BLOOD PRESSURE: 82 MMHG | SYSTOLIC BLOOD PRESSURE: 134 MMHG

## 2024-05-06 DIAGNOSIS — O09.899 SUPERVISION OF OTHER HIGH RISK PREGNANCY, ANTEPARTUM (HHS-HCC): ICD-10-CM

## 2024-05-06 DIAGNOSIS — O99.013 ANEMIA DURING PREGNANCY IN THIRD TRIMESTER (HHS-HCC): ICD-10-CM

## 2024-05-06 DIAGNOSIS — Z98.891 HX OF CESAREAN SECTION: ICD-10-CM

## 2024-05-06 DIAGNOSIS — Z28.39 MATERNAL VARICELLA, NON-IMMUNE (HHS-HCC): ICD-10-CM

## 2024-05-06 DIAGNOSIS — Z87.59 HISTORY OF POSTPARTUM HEMORRHAGE: ICD-10-CM

## 2024-05-06 DIAGNOSIS — O09.899 MATERNAL VARICELLA, NON-IMMUNE (HHS-HCC): ICD-10-CM

## 2024-05-06 PROCEDURE — 83550 IRON BINDING TEST: CPT

## 2024-05-06 PROCEDURE — 85027 COMPLETE CBC AUTOMATED: CPT

## 2024-05-06 PROCEDURE — 83540 ASSAY OF IRON: CPT

## 2024-05-06 PROCEDURE — 36415 COLL VENOUS BLD VENIPUNCTURE: CPT

## 2024-05-06 PROCEDURE — 99213 OFFICE O/P EST LOW 20 MIN: CPT | Mod: GC,TH

## 2024-05-06 PROCEDURE — 99213 OFFICE O/P EST LOW 20 MIN: CPT

## 2024-05-06 NOTE — PROGRESS NOTES
Subjective   Patient ID [id number]   Josselyn Mcarthur is a 28 y.o.  at 28w3d with a working estimated date of delivery of 2024, by Ultrasound who presents for a routine prenatal visit. Patient reports intermittent lower abdomen discomfort and right lower extremity nerve pain that is exacerbated while standing at work. She denies vaginal bleeding, leakage of fluid, decreased fetal movements, or contractions.    Pregnancy notable for  -Class III obesity,   - Anemia, last Hgb 10.7, increased iron in diet     Objective   Physical Exam     Expected Total Weight Gain: 5 kg (11 lb)-9 kg (19 lb)   Pregravid BMI: 43.28  BP: 134/82 (109)     Fundal Height: 28 cm   FHR: audible, unable to visualize reading due to Doppler malfunctioning   Abdomen: soft, gravid, non-tender     Assessment/Plan   .  Problem List Items Addressed This Visit       Anemia during pregnancy in third trimester ([facility]-HCC)    Overview     -last Hgb 10.7, normal Iron and TIBC, normal Hg ID  - currently increased iron rich foods in diet          Current Assessment & Plan     -repeat CBC and TIBC/Iron ordered          BMI 40.0-44.9, adult (Multi) - Primary    Overview     Pre-preg BMI 43  A1C at new OB:   Fetal surveillance BMI >40 at NOB:  Growth US at 30 and 36 wks  BPP or NST weekly 34 wks to delivery    Timing of delivery: 39 0/7 - 39 6/7 wks  *BMI >= 50 at any time in pregnancy: Deliver at Level 4           History of postpartum hemorrhage    Overview     : 1850ml EBL, TXA/cyto          Relevant Orders    CBC    Iron and TIBC    Hx of  section    Overview     LTCS  due to NRFHT remote from birth     MFMU 48.6% NEEDS MD consult based on 268lb pre-preg weight. Obtain TOLAC consent form at next visit     Will need to be TXC pRBC for induction          Maternal varicella, non-immune ([facility]-HCC)    Overview     Offer varivax PP         Supervision of other high risk pregnancy, antepartum ([facility])    Overview     Desired provider  in labor: [] CNM  [] Physician  [x] Dated by:  12w US  [x] Initial BMI: 43  [x] Prenatal Labs:  WNL except varicella nonimmue  [x] Rh status: B+  [x] Genetic Screening:  declined   [x] Baby ASA: prescribed 1/15    [] Anatomy US:  [] Fetal Sex:   [] Patient added to BirthTracks  [x] 1hr GCT at 24-28wks: drawn 4/11   [] 3 hr GTT (if indicated):  [] Fetal Surveillance (if indicated):    [] Tdap (27-36wks):  [x] Flu Shot: declined 1/15  [] COVID vaccine:     [] Breastfeeding:  [] Pain management during labor:   [] Postpartum Birth control method:     [] GBS at 36 wks:  [] 39 weeks discussion of IOL vs. Expectant management:  [] Mode of delivery:              Current Assessment & Plan     -for Tdap at next visit           Follow up in 2 weeks for a routine prenatal visit.    Seen and d/w Dr. Silva,  Aliza Harden MD, PGY02

## 2024-05-07 DIAGNOSIS — O99.013 ANEMIA DURING PREGNANCY IN THIRD TRIMESTER (HHS-HCC): Primary | ICD-10-CM

## 2024-05-07 LAB
ERYTHROCYTE [DISTWIDTH] IN BLOOD BY AUTOMATED COUNT: 15.1 % (ref 11.5–14.5)
HCT VFR BLD AUTO: 34.3 % (ref 36–46)
HGB BLD-MCNC: 10.8 G/DL (ref 12–16)
IRON SATN MFR SERPL: ABNORMAL %
IRON SERPL-MCNC: 68 UG/DL (ref 35–150)
MCH RBC QN AUTO: 27.5 PG (ref 26–34)
MCHC RBC AUTO-ENTMCNC: 31.5 G/DL (ref 32–36)
MCV RBC AUTO: 87 FL (ref 80–100)
NRBC BLD-RTO: 0 /100 WBCS (ref 0–0)
PLATELET # BLD AUTO: 252 X10*3/UL (ref 150–450)
RBC # BLD AUTO: 3.93 X10*6/UL (ref 4–5.2)
TIBC SERPL-MCNC: ABNORMAL UG/DL
UIBC SERPL-MCNC: >450 UG/DL (ref 110–370)
WBC # BLD AUTO: 9.5 X10*3/UL (ref 4.4–11.3)

## 2024-05-07 RX ORDER — FERROUS GLUCONATE 324(38)MG
38 TABLET ORAL
Qty: 12 TABLET | Refills: 11 | Status: SHIPPED | OUTPATIENT
Start: 2024-05-08 | End: 2025-05-08

## 2024-05-07 NOTE — PROGRESS NOTES
I saw and evaluated the patient. I personally obtained the key and critical portions of the history and physical exam or was physically present for key and critical portions performed by the resident/fellow. I reviewed the resident/fellow's documentation and discussed the patient with the resident/fellow. I agree with the resident/fellow's medical decision making as documented in the note.    Justyna Silva MD

## 2024-05-20 ENCOUNTER — HOSPITAL ENCOUNTER (OUTPATIENT)
Dept: RADIOLOGY | Facility: CLINIC | Age: 29
Discharge: HOME | End: 2024-05-20
Payer: MEDICAID

## 2024-05-20 DIAGNOSIS — Z36.3 ENCOUNTER FOR ANTENATAL SCREENING FOR MALFORMATIONS (HHS-HCC): ICD-10-CM

## 2024-05-20 DIAGNOSIS — E66.01 CLASS 3 OBESITY (MULTI): ICD-10-CM

## 2024-05-20 PROCEDURE — 76816 OB US FOLLOW-UP PER FETUS: CPT | Performed by: OBSTETRICS & GYNECOLOGY

## 2024-05-20 PROCEDURE — 76819 FETAL BIOPHYS PROFIL W/O NST: CPT

## 2024-05-20 PROCEDURE — 76819 FETAL BIOPHYS PROFIL W/O NST: CPT | Performed by: OBSTETRICS & GYNECOLOGY

## 2024-05-20 PROCEDURE — 76816 OB US FOLLOW-UP PER FETUS: CPT

## 2024-06-06 ENCOUNTER — ROUTINE PRENATAL (OUTPATIENT)
Dept: OBSTETRICS AND GYNECOLOGY | Facility: CLINIC | Age: 29
End: 2024-06-06
Payer: MEDICAID

## 2024-06-06 VITALS
SYSTOLIC BLOOD PRESSURE: 135 MMHG | WEIGHT: 284.9 LBS | BODY MASS INDEX: 45.98 KG/M2 | HEART RATE: 99 BPM | DIASTOLIC BLOOD PRESSURE: 83 MMHG

## 2024-06-06 DIAGNOSIS — Z28.39 MATERNAL VARICELLA, NON-IMMUNE (HHS-HCC): ICD-10-CM

## 2024-06-06 DIAGNOSIS — O99.013 ANEMIA DURING PREGNANCY IN THIRD TRIMESTER (HHS-HCC): ICD-10-CM

## 2024-06-06 DIAGNOSIS — Z98.891 HX OF CESAREAN SECTION: ICD-10-CM

## 2024-06-06 DIAGNOSIS — O09.899 SUPERVISION OF OTHER HIGH RISK PREGNANCY, ANTEPARTUM (HHS-HCC): ICD-10-CM

## 2024-06-06 DIAGNOSIS — O09.899 MATERNAL VARICELLA, NON-IMMUNE (HHS-HCC): ICD-10-CM

## 2024-06-06 DIAGNOSIS — Z87.59 HISTORY OF POSTPARTUM HEMORRHAGE: ICD-10-CM

## 2024-06-06 PROCEDURE — 99213 OFFICE O/P EST LOW 20 MIN: CPT

## 2024-06-06 PROCEDURE — 99213 OFFICE O/P EST LOW 20 MIN: CPT | Mod: GC,TH

## 2024-06-06 ASSESSMENT — PAIN SCALES - GENERAL: PAINLEVEL_OUTOF10: 0 - NO PAIN

## 2024-06-06 ASSESSMENT — ENCOUNTER SYMPTOMS
CONSTITUTIONAL NEGATIVE: 0
CARDIOVASCULAR NEGATIVE: 0
EYES NEGATIVE: 0
PSYCHIATRIC NEGATIVE: 0
ENDOCRINE NEGATIVE: 0
MUSCULOSKELETAL NEGATIVE: 0
ALLERGIC/IMMUNOLOGIC NEGATIVE: 0
RESPIRATORY NEGATIVE: 0
HEMATOLOGIC/LYMPHATIC NEGATIVE: 0
GASTROINTESTINAL NEGATIVE: 0
NEUROLOGICAL NEGATIVE: 0

## 2024-06-06 ASSESSMENT — PAIN - FUNCTIONAL ASSESSMENT: PAIN_FUNCTIONAL_ASSESSMENT: 0-10

## 2024-06-06 ASSESSMENT — PATIENT HEALTH QUESTIONNAIRE - PHQ9: 2. FEELING DOWN, DEPRESSED OR HOPELESS: NOT AT ALL

## 2024-06-06 NOTE — PROGRESS NOTES
Subjective   Patient ID [id number]   Orrios Mcarthur is a 28 y.o.  at 32w6d with a working estimated date of delivery of 2024, by Ultrasound who presents for a routine prenatal visit. She denies vaginal bleeding, leakage of fluid, decreased fetal movements, or contractions.    Pt reports irregular ctxs, denies vaginal bleeding, leakage of flud and contraction. She reports a two day episode of abdominal cramping, profuse sweating and frequent defecation that spontaneously reolved. Blood pressure at home normotensive. Denies HA, change in ivsion, SOB, CP and RUQ pain.     Objective   Physical Exam:   Weight: 129 kg (284 lb 14.4 oz)  Expected Total Weight Gain: 5 kg (11 lb)-9 kg (19 lb)   Pregravid BMI: 43.28  BP: 135/83  Fetal Heart Rate: 150           Fundal Height: 33cm       Assessment/Plan     .  Problem List Items Addressed This Visit       Anemia during pregnancy in third trimester ([facility]-HCC)    Overview     -last Hgb 10.7, normal Iron and TIBC, normal Hg ID  - currently increased iron rich foods in diet          BMI 40.0-44.9, adult (Multi)    Overview     Pre-preg BMI 43  A1C at new OB:   Fetal surveillance BMI >40 at NOB:  Growth US at 30 (43%) and 36 wks  BPP or NST weekly 34 wks to delivery    Timing of delivery: 39 0/7 - 39 6/7 wks  *BMI >= 50 at any time in pregnancy: Deliver at Level 4           History of postpartum hemorrhage    Overview     : 1850ml EBL, TXA/cyto          Hx of  section    Overview     LTCS  due to NRFHT remote from birth     MFMU 48.6% NEEDS MD consult based on 268lb pre-preg weight. Obtain TOLAC consent form at next visit     Will need to be TXC pRBC for induction          Maternal varicella, non-immune ([facility])    Overview     Offer varivax PP         Supervision of other high risk pregnancy, antepartum ([facility])    Overview     Desired provider in labor: [] CNM  [] Physician  [x] Dated by:  12w US  [x] Initial BMI: 43  [x] Prenatal Labs:  WNL except  varicella nonimmue  [x] Rh status: B+  [x] Genetic Screening:  declined   [x] Baby ASA: prescribed 1/15    [x] Anatomy US:  [x] 1hr GCT at 24-28wks: drawn 4/11   [] 3 hr GTT (if indicated):  [] Fetal Surveillance (if indicated): weekly NST starting at 34 wga     [] Tdap (27-36wks): considering   [x] Flu Shot: declined 1/15  [] COVID vaccine:     [] Breastfeeding:  [] Pain management during labor:   [] Postpartum Birth control method: nexplanon     [] GBS at 36 wks:  [] 39 weeks discussion of IOL vs. Expectant management:  [] Mode of delivery: TOLAC              Current Assessment & Plan     -discuss Tdap at next visit   - irregular ctx, likely DAVID HarrisE c/l/h           RTC in 2 weeks for PRISCILLA    Seen and d/w Dr. Jackson,  .Aliza Harden MD, PGY2

## 2024-06-07 NOTE — PROGRESS NOTES
I saw and evaluated the patient. I personally obtained the key and critical portions of the history and physical exam or was physically present for key and critical portions performed by the resident/fellow. I reviewed the resident/fellow's documentation and discussed the patient with the resident/fellow. I agree with the resident/fellow's medical decision making as documented in the note.    Neli Jackson MD

## 2024-06-20 ENCOUNTER — APPOINTMENT (OUTPATIENT)
Dept: RADIOLOGY | Facility: CLINIC | Age: 29
End: 2024-06-20
Payer: MEDICAID

## 2024-06-28 ENCOUNTER — HOSPITAL ENCOUNTER (OUTPATIENT)
Dept: RADIOLOGY | Facility: CLINIC | Age: 29
Discharge: HOME | End: 2024-06-28
Payer: MEDICAID

## 2024-06-28 DIAGNOSIS — O99.210 OBESITY AFFECTING PREGNANCY (HHS-HCC): ICD-10-CM

## 2024-06-28 DIAGNOSIS — Z36.3 ENCOUNTER FOR ANTENATAL SCREENING FOR MALFORMATIONS (HHS-HCC): ICD-10-CM

## 2024-06-28 PROBLEM — O33.5XX0: Status: ACTIVE | Noted: 2024-06-28

## 2024-06-28 PROCEDURE — 76819 FETAL BIOPHYS PROFIL W/O NST: CPT

## 2024-06-28 PROCEDURE — 76816 OB US FOLLOW-UP PER FETUS: CPT

## 2024-07-08 ENCOUNTER — ROUTINE PRENATAL (OUTPATIENT)
Dept: OBSTETRICS AND GYNECOLOGY | Facility: CLINIC | Age: 29
End: 2024-07-08
Payer: MEDICAID

## 2024-07-08 VITALS — BODY MASS INDEX: 46.15 KG/M2 | WEIGHT: 285.9 LBS | DIASTOLIC BLOOD PRESSURE: 84 MMHG | SYSTOLIC BLOOD PRESSURE: 136 MMHG

## 2024-07-08 DIAGNOSIS — Z98.891 HX OF CESAREAN SECTION: ICD-10-CM

## 2024-07-08 DIAGNOSIS — Z87.59 HISTORY OF POSTPARTUM HEMORRHAGE: ICD-10-CM

## 2024-07-08 DIAGNOSIS — I10 CHRONIC HYPERTENSION: Primary | ICD-10-CM

## 2024-07-08 DIAGNOSIS — O99.013 ANEMIA DURING PREGNANCY IN THIRD TRIMESTER (HHS-HCC): ICD-10-CM

## 2024-07-08 DIAGNOSIS — O09.899 SUPERVISION OF OTHER HIGH RISK PREGNANCY, ANTEPARTUM (HHS-HCC): ICD-10-CM

## 2024-07-08 PROCEDURE — 87081 CULTURE SCREEN ONLY: CPT

## 2024-07-08 ASSESSMENT — ENCOUNTER SYMPTOMS
CONSTITUTIONAL NEGATIVE: 0
ENDOCRINE NEGATIVE: 0
NEUROLOGICAL NEGATIVE: 0
EYES NEGATIVE: 0
PSYCHIATRIC NEGATIVE: 0
RESPIRATORY NEGATIVE: 0
HEMATOLOGIC/LYMPHATIC NEGATIVE: 0
ALLERGIC/IMMUNOLOGIC NEGATIVE: 0
CARDIOVASCULAR NEGATIVE: 0
MUSCULOSKELETAL NEGATIVE: 0
GASTROINTESTINAL NEGATIVE: 0

## 2024-07-08 ASSESSMENT — PATIENT HEALTH QUESTIONNAIRE - PHQ9
2. FEELING DOWN, DEPRESSED OR HOPELESS: NOT AT ALL
SUM OF ALL RESPONSES TO PHQ9 QUESTIONS 1 AND 2: 0
1. LITTLE INTEREST OR PLEASURE IN DOING THINGS: NOT AT ALL

## 2024-07-08 NOTE — PROGRESS NOTES
Subjective   Patient ID [id number]   Orrios Mcarthur is a 28 y.o.  at 37w3d with a working estimated date of delivery of 2024, by Ultrasound who presents for a routine prenatal visit. She denies vaginal bleeding, leakage of fluid, decreased fetal movements, or contractions.      Objective   Physical Exam:   Weight: 130 kg (285 lb 14.4 oz)  Expected Total Weight Gain: 5 kg (11 lb)-9 kg (19 lb)   Pregravid BMI: 43.28  BP: 136/84  Fetal Heart Rate: 164               Prenatal Labs  Urine Dip:  Lab Results   Component Value Date    KETONESU NEGATIVE 2017     Lab Results   Component Value Date    HGB 10.8 (L) 2024    HCT 34.3 (L) 2024    ABO B 2023    HEPBSAG Nonreactive 2023            Assessment/Plan   Problem List Items Addressed This Visit       Anemia during pregnancy in third trimester ([facility]-H[facility])    Overview     -last Hgb 10.7, normal Iron and TIBC, normal Hg ID  - currently increased iron rich foods in diet          BMI 40.0-44.9, adult (Multi)    Overview     Pre-preg BMI 43  A1C at new OB:   Fetal surveillance BMI >40 at NOB:  Growth US at 30 (43%) and 36 wks  BPP or NST weekly 34 wks to delivery    Timing of delivery: 39 0/7 - 39 6/7 wks  *BMI >= 50 at any time in pregnancy: Deliver at Level 4           Chronic hypertension - Primary    Overview     cHTN was on on amlodipine 5mg daily with  birth  HELLP labs ordered at new OB; WNL , p/c ratio at 25 0.13   Normotensive at home and in clinic    Growth US at 30 and 36 wks  Timing of delivery: 38 0/7 - 39 6/7  Expectant management beyond 39 0/7 weeks only with careful consideration of risks/benefits & with appropriate surveillance           Current Assessment & Plan     For IOL at 39wga, scheduled for . Offered earlier induction, pt declined.         Relevant Orders    Labor Induction    History of postpartum hemorrhage    Overview     : 1850ml EBL, TXA/cyto          Hx of  section    Overview     LTCS  2022 due to NRFHT remote from birth     MFMU 48.6% NEEDS MD consult based on 268lb pre-preg weight. Desires TOLAC.     Will need to be TXC pRBC for induction          Supervision of other high risk pregnancy, antepartum ([facility])    Overview     Desired provider in labor: [] CNM  [x] Physician  [x] Dated by:  12w US  [x] Initial BMI: 43  [x] Prenatal Labs:  WNL except varicella nonimmue  [x] Rh status: B+  [x] Genetic Screening:  declined   [x] Baby ASA: prescribed 1/15    [x] Anatomy US:  [x] 1hr GCT at 24-28wks: drawn 4/11   [] 3 hr GTT (if indicated):  [] Fetal Surveillance (if indicated): weekly NST starting at 34 wga     [] Tdap (27-36wks): declined   [x] Flu Shot: declined 1/15  [] COVID vaccine:     [] Breastfeeding:  [] Pain management during labor:   [x] Postpartum Birth control method: nexplanon     [x] GBS at 36 wks: collected 7/8  [x] 39 weeks discussion of IOL vs. Expectant management: scheduled IOL on 7/19  [x] Mode of delivery: TOLAC              Relevant Orders    Group B Streptococcus (GBS) Prenatal Screen, Culture    Labor Induction       39wk IOL scheduled.  Follow up in 1 week for a routine prenatal visit.    Seen and discussed w/ Dr. Praneeth Scherer MD PGY-2  Obstetrics & Gynecology

## 2024-07-11 LAB — GP B STREP GENITAL QL CULT: NORMAL

## 2024-07-15 NOTE — PROGRESS NOTES
I saw and evaluated the patient. I personally obtained the key and critical portions of the history and physical exam or was physically present for key and critical portions performed by the resident/fellow. I reviewed the resident/fellow's documentation and discussed the patient with the resident/fellow. I agree with the resident/fellow's medical decision making as documented in the note.    Corrie Dacosta MD

## 2024-07-17 ENCOUNTER — TELEPHONE (OUTPATIENT)
Dept: OBSTETRICS AND GYNECOLOGY | Facility: CLINIC | Age: 29
End: 2024-07-17
Payer: MEDICAID

## 2024-07-17 NOTE — TELEPHONE ENCOUNTER
FMLA papers completed and pt to  today, copy to scan. RN called pt today and informed they are completed and has upcoming IOL scheduled.

## 2024-07-19 ENCOUNTER — ANESTHESIA (OUTPATIENT)
Dept: OBSTETRICS AND GYNECOLOGY | Facility: HOSPITAL | Age: 29
End: 2024-07-19
Payer: MEDICAID

## 2024-07-19 ENCOUNTER — APPOINTMENT (OUTPATIENT)
Dept: OBSTETRICS AND GYNECOLOGY | Facility: HOSPITAL | Age: 29
End: 2024-07-19
Payer: MEDICAID

## 2024-07-19 ENCOUNTER — ANESTHESIA EVENT (OUTPATIENT)
Dept: OBSTETRICS AND GYNECOLOGY | Facility: HOSPITAL | Age: 29
End: 2024-07-19
Payer: MEDICAID

## 2024-07-19 ENCOUNTER — HOSPITAL ENCOUNTER (INPATIENT)
Facility: HOSPITAL | Age: 29
End: 2024-07-19
Attending: OBSTETRICS & GYNECOLOGY | Admitting: STUDENT IN AN ORGANIZED HEALTH CARE EDUCATION/TRAINING PROGRAM
Payer: MEDICAID

## 2024-07-19 DIAGNOSIS — I10 CHRONIC HYPERTENSION: ICD-10-CM

## 2024-07-19 DIAGNOSIS — O09.899 SUPERVISION OF OTHER HIGH RISK PREGNANCY, ANTEPARTUM (HHS-HCC): ICD-10-CM

## 2024-07-19 PROBLEM — J45.909 MILD ASTHMA (HHS-HCC): Status: ACTIVE | Noted: 2024-07-19

## 2024-07-19 LAB
ABO GROUP (TYPE) IN BLOOD: NORMAL
ALBUMIN SERPL BCP-MCNC: 3.2 G/DL (ref 3.4–5)
ALP SERPL-CCNC: 154 U/L (ref 33–110)
ALT SERPL W P-5'-P-CCNC: 34 U/L (ref 7–45)
ANION GAP SERPL CALC-SCNC: 19 MMOL/L (ref 10–20)
ANTIBODY SCREEN: NORMAL
AST SERPL W P-5'-P-CCNC: 15 U/L (ref 9–39)
BILIRUB SERPL-MCNC: 0.3 MG/DL (ref 0–1.2)
BUN SERPL-MCNC: 8 MG/DL (ref 6–23)
CALCIUM SERPL-MCNC: 8.9 MG/DL (ref 8.6–10.6)
CHLORIDE SERPL-SCNC: 108 MMOL/L (ref 98–107)
CO2 SERPL-SCNC: 16 MMOL/L (ref 21–32)
CREAT SERPL-MCNC: 0.71 MG/DL (ref 0.5–1.05)
EGFRCR SERPLBLD CKD-EPI 2021: >90 ML/MIN/1.73M*2
ERYTHROCYTE [DISTWIDTH] IN BLOOD BY AUTOMATED COUNT: 15 % (ref 11.5–14.5)
GLUCOSE SERPL-MCNC: 155 MG/DL (ref 74–99)
HCT VFR BLD AUTO: 32.3 % (ref 36–46)
HGB BLD-MCNC: 10.3 G/DL (ref 12–16)
MCH RBC QN AUTO: 26.2 PG (ref 26–34)
MCHC RBC AUTO-ENTMCNC: 31.9 G/DL (ref 32–36)
MCV RBC AUTO: 82 FL (ref 80–100)
NRBC BLD-RTO: 0 /100 WBCS (ref 0–0)
PLATELET # BLD AUTO: 237 X10*3/UL (ref 150–450)
POTASSIUM SERPL-SCNC: 4.1 MMOL/L (ref 3.5–5.3)
PROT SERPL-MCNC: 6.4 G/DL (ref 6.4–8.2)
RBC # BLD AUTO: 3.93 X10*6/UL (ref 4–5.2)
RH FACTOR (ANTIGEN D): NORMAL
SODIUM SERPL-SCNC: 139 MMOL/L (ref 136–145)
TREPONEMA PALLIDUM IGG+IGM AB [PRESENCE] IN SERUM OR PLASMA BY IMMUNOASSAY: NONREACTIVE
WBC # BLD AUTO: 7.5 X10*3/UL (ref 4.4–11.3)

## 2024-07-19 PROCEDURE — 85027 COMPLETE CBC AUTOMATED: CPT | Performed by: STUDENT IN AN ORGANIZED HEALTH CARE EDUCATION/TRAINING PROGRAM

## 2024-07-19 PROCEDURE — 1120000001 HC OB PRIVATE ROOM DAILY

## 2024-07-19 PROCEDURE — 86901 BLOOD TYPING SEROLOGIC RH(D): CPT | Performed by: STUDENT IN AN ORGANIZED HEALTH CARE EDUCATION/TRAINING PROGRAM

## 2024-07-19 PROCEDURE — 0UH90HZ INSERTION OF CONTRACEPTIVE DEVICE INTO UTERUS, OPEN APPROACH: ICD-10-PCS | Performed by: STUDENT IN AN ORGANIZED HEALTH CARE EDUCATION/TRAINING PROGRAM

## 2024-07-19 PROCEDURE — 99221 1ST HOSP IP/OBS SF/LOW 40: CPT | Performed by: STUDENT IN AN ORGANIZED HEALTH CARE EDUCATION/TRAINING PROGRAM

## 2024-07-19 PROCEDURE — 36415 COLL VENOUS BLD VENIPUNCTURE: CPT | Performed by: STUDENT IN AN ORGANIZED HEALTH CARE EDUCATION/TRAINING PROGRAM

## 2024-07-19 PROCEDURE — 2500000004 HC RX 250 GENERAL PHARMACY W/ HCPCS (ALT 636 FOR OP/ED)

## 2024-07-19 PROCEDURE — 2500000004 HC RX 250 GENERAL PHARMACY W/ HCPCS (ALT 636 FOR OP/ED): Performed by: STUDENT IN AN ORGANIZED HEALTH CARE EDUCATION/TRAINING PROGRAM

## 2024-07-19 PROCEDURE — 7210000002 HC LABOR PER HOUR

## 2024-07-19 PROCEDURE — 86780 TREPONEMA PALLIDUM: CPT | Performed by: STUDENT IN AN ORGANIZED HEALTH CARE EDUCATION/TRAINING PROGRAM

## 2024-07-19 PROCEDURE — 80053 COMPREHEN METABOLIC PANEL: CPT

## 2024-07-19 PROCEDURE — 3700000014 EPIDURAL BLOCK: Performed by: ANESTHESIOLOGY

## 2024-07-19 PROCEDURE — 2500000005 HC RX 250 GENERAL PHARMACY W/O HCPCS

## 2024-07-19 RX ORDER — OXYTOCIN/0.9 % SODIUM CHLORIDE 30/500 ML
60 PLASTIC BAG, INJECTION (ML) INTRAVENOUS ONCE AS NEEDED
Status: DISCONTINUED | OUTPATIENT
Start: 2024-07-19 | End: 2024-07-19

## 2024-07-19 RX ORDER — METHYLERGONOVINE MALEATE 0.2 MG/ML
0.2 INJECTION INTRAVENOUS ONCE AS NEEDED
Status: DISCONTINUED | OUTPATIENT
Start: 2024-07-19 | End: 2024-07-19

## 2024-07-19 RX ORDER — LIDOCAINE HYDROCHLORIDE 10 MG/ML
30 INJECTION INFILTRATION; PERINEURAL ONCE AS NEEDED
Status: DISCONTINUED | OUTPATIENT
Start: 2024-07-19 | End: 2024-07-19

## 2024-07-19 RX ORDER — OXYTOCIN 10 [USP'U]/ML
10 INJECTION, SOLUTION INTRAMUSCULAR; INTRAVENOUS ONCE AS NEEDED
Status: DISCONTINUED | OUTPATIENT
Start: 2024-07-19 | End: 2024-07-19

## 2024-07-19 RX ORDER — SODIUM CHLORIDE, SODIUM LACTATE, POTASSIUM CHLORIDE, CALCIUM CHLORIDE 600; 310; 30; 20 MG/100ML; MG/100ML; MG/100ML; MG/100ML
125 INJECTION, SOLUTION INTRAVENOUS CONTINUOUS
Status: DISCONTINUED | OUTPATIENT
Start: 2024-07-19 | End: 2024-07-19

## 2024-07-19 RX ORDER — CARBOPROST TROMETHAMINE 250 UG/ML
250 INJECTION, SOLUTION INTRAMUSCULAR ONCE AS NEEDED
Status: DISCONTINUED | OUTPATIENT
Start: 2024-07-19 | End: 2024-07-19

## 2024-07-19 RX ORDER — MORPHINE SULFATE 2 MG/ML
2 INJECTION, SOLUTION INTRAMUSCULAR; INTRAVENOUS ONCE
Status: COMPLETED | OUTPATIENT
Start: 2024-07-19 | End: 2024-07-19

## 2024-07-19 RX ORDER — METOCLOPRAMIDE 10 MG/1
10 TABLET ORAL EVERY 6 HOURS PRN
Status: DISCONTINUED | OUTPATIENT
Start: 2024-07-19 | End: 2024-07-19

## 2024-07-19 RX ORDER — TRANEXAMIC ACID 100 MG/ML
1000 INJECTION, SOLUTION INTRAVENOUS ONCE AS NEEDED
Status: DISCONTINUED | OUTPATIENT
Start: 2024-07-19 | End: 2024-07-19

## 2024-07-19 RX ORDER — FENTANYL/BUPIVACAINE/NS/PF 2MCG/ML-.1
PLASTIC BAG, INJECTION (ML) INJECTION AS NEEDED
Status: DISCONTINUED | OUTPATIENT
Start: 2024-07-19 | End: 2024-07-20

## 2024-07-19 RX ORDER — FENTANYL/BUPIVACAINE/NS/PF 2MCG/ML-.1
PLASTIC BAG, INJECTION (ML) INJECTION CONTINUOUS PRN
Status: DISCONTINUED | OUTPATIENT
Start: 2024-07-19 | End: 2024-07-20

## 2024-07-19 RX ORDER — MISOPROSTOL 200 UG/1
800 TABLET ORAL ONCE AS NEEDED
Status: DISCONTINUED | OUTPATIENT
Start: 2024-07-19 | End: 2024-07-19

## 2024-07-19 RX ORDER — ONDANSETRON HYDROCHLORIDE 2 MG/ML
4 INJECTION, SOLUTION INTRAVENOUS EVERY 6 HOURS PRN
Status: DISCONTINUED | OUTPATIENT
Start: 2024-07-19 | End: 2024-07-19

## 2024-07-19 RX ORDER — NIFEDIPINE 10 MG/1
10 CAPSULE ORAL ONCE AS NEEDED
Status: DISCONTINUED | OUTPATIENT
Start: 2024-07-19 | End: 2024-07-19

## 2024-07-19 RX ORDER — ONDANSETRON 4 MG/1
4 TABLET, FILM COATED ORAL EVERY 6 HOURS PRN
Status: DISCONTINUED | OUTPATIENT
Start: 2024-07-19 | End: 2024-07-19

## 2024-07-19 RX ORDER — LABETALOL HYDROCHLORIDE 5 MG/ML
20 INJECTION, SOLUTION INTRAVENOUS ONCE AS NEEDED
Status: DISCONTINUED | OUTPATIENT
Start: 2024-07-19 | End: 2024-07-19

## 2024-07-19 RX ORDER — FERROUS GLUCONATE 325 MG
38 TABLET ORAL
Status: DISCONTINUED | OUTPATIENT
Start: 2024-07-19 | End: 2024-07-20

## 2024-07-19 RX ORDER — OXYTOCIN/0.9 % SODIUM CHLORIDE 30/500 ML
2-30 PLASTIC BAG, INJECTION (ML) INTRAVENOUS CONTINUOUS
Status: DISCONTINUED | OUTPATIENT
Start: 2024-07-19 | End: 2024-07-19

## 2024-07-19 RX ORDER — HYDRALAZINE HYDROCHLORIDE 20 MG/ML
5 INJECTION INTRAMUSCULAR; INTRAVENOUS ONCE AS NEEDED
Status: DISCONTINUED | OUTPATIENT
Start: 2024-07-19 | End: 2024-07-19

## 2024-07-19 RX ORDER — OXYTOCIN/0.9 % SODIUM CHLORIDE 30/500 ML
2-30 PLASTIC BAG, INJECTION (ML) INTRAVENOUS CONTINUOUS
Status: DISCONTINUED | OUTPATIENT
Start: 2024-07-19 | End: 2024-07-20

## 2024-07-19 RX ORDER — LOPERAMIDE HYDROCHLORIDE 2 MG/1
4 CAPSULE ORAL EVERY 2 HOUR PRN
Status: DISCONTINUED | OUTPATIENT
Start: 2024-07-19 | End: 2024-07-19

## 2024-07-19 RX ORDER — METOCLOPRAMIDE HYDROCHLORIDE 5 MG/ML
10 INJECTION INTRAMUSCULAR; INTRAVENOUS EVERY 6 HOURS PRN
Status: DISCONTINUED | OUTPATIENT
Start: 2024-07-19 | End: 2024-07-19

## 2024-07-19 RX ORDER — TERBUTALINE SULFATE 1 MG/ML
0.25 INJECTION SUBCUTANEOUS ONCE AS NEEDED
Status: DISCONTINUED | OUTPATIENT
Start: 2024-07-19 | End: 2024-07-19

## 2024-07-19 SDOH — SOCIAL STABILITY: SOCIAL INSECURITY: ARE YOU OR HAVE YOU BEEN THREATENED OR ABUSED PHYSICALLY, EMOTIONALLY, OR SEXUALLY BY ANYONE?: NO

## 2024-07-19 SDOH — HEALTH STABILITY: MENTAL HEALTH: NON-SPECIFIC ACTIVE SUICIDAL THOUGHTS (PAST 1 MONTH): NO

## 2024-07-19 SDOH — SOCIAL STABILITY: SOCIAL INSECURITY: ABUSE SCREEN: ADULT

## 2024-07-19 SDOH — SOCIAL STABILITY: SOCIAL INSECURITY: DOES ANYONE TRY TO KEEP YOU FROM HAVING/CONTACTING OTHER FRIENDS OR DOING THINGS OUTSIDE YOUR HOME?: NO

## 2024-07-19 SDOH — SOCIAL STABILITY: SOCIAL INSECURITY: VERBAL ABUSE: DENIES

## 2024-07-19 SDOH — SOCIAL STABILITY: SOCIAL INSECURITY: HAVE YOU HAD ANY THOUGHTS OF HARMING ANYONE ELSE?: NO

## 2024-07-19 SDOH — SOCIAL STABILITY: SOCIAL INSECURITY: ARE THERE ANY APPARENT SIGNS OF INJURIES/BEHAVIORS THAT COULD BE RELATED TO ABUSE/NEGLECT?: NO

## 2024-07-19 SDOH — HEALTH STABILITY: MENTAL HEALTH: WISH TO BE DEAD (PAST 1 MONTH): NO

## 2024-07-19 SDOH — SOCIAL STABILITY: SOCIAL INSECURITY: DO YOU FEEL ANYONE HAS EXPLOITED OR TAKEN ADVANTAGE OF YOU FINANCIALLY OR OF YOUR PERSONAL PROPERTY?: NO

## 2024-07-19 SDOH — HEALTH STABILITY: MENTAL HEALTH: SUICIDAL BEHAVIOR (LIFETIME): NO

## 2024-07-19 SDOH — ECONOMIC STABILITY: HOUSING INSECURITY: DO YOU FEEL UNSAFE GOING BACK TO THE PLACE WHERE YOU ARE LIVING?: NO

## 2024-07-19 SDOH — SOCIAL STABILITY: SOCIAL INSECURITY: HAS ANYONE EVER THREATENED TO HURT YOUR FAMILY OR YOUR PETS?: NO

## 2024-07-19 SDOH — HEALTH STABILITY: MENTAL HEALTH: WERE YOU ABLE TO COMPLETE ALL THE BEHAVIORAL HEALTH SCREENINGS?: YES

## 2024-07-19 SDOH — SOCIAL STABILITY: SOCIAL INSECURITY: HAVE YOU HAD THOUGHTS OF HARMING ANYONE ELSE?: NO

## 2024-07-19 SDOH — SOCIAL STABILITY: SOCIAL INSECURITY: PHYSICAL ABUSE: DENIES

## 2024-07-19 ASSESSMENT — PATIENT HEALTH QUESTIONNAIRE - PHQ9
SUM OF ALL RESPONSES TO PHQ9 QUESTIONS 1 & 2: 0
2. FEELING DOWN, DEPRESSED OR HOPELESS: NOT AT ALL
1. LITTLE INTEREST OR PLEASURE IN DOING THINGS: NOT AT ALL

## 2024-07-19 ASSESSMENT — PAIN SCALES - GENERAL
PAINLEVEL_OUTOF10: 0 - NO PAIN
PAINLEVEL_OUTOF10: 0 - NO PAIN
PAINLEVEL_OUTOF10: 5 - MODERATE PAIN
PAINLEVEL_OUTOF10: 0 - NO PAIN

## 2024-07-19 ASSESSMENT — LIFESTYLE VARIABLES
HOW OFTEN DO YOU HAVE 6 OR MORE DRINKS ON ONE OCCASION: NEVER
AUDIT-C TOTAL SCORE: 0
AUDIT-C TOTAL SCORE: 0
HOW OFTEN DO YOU HAVE A DRINK CONTAINING ALCOHOL: NEVER
SKIP TO QUESTIONS 9-10: 1
HOW MANY STANDARD DRINKS CONTAINING ALCOHOL DO YOU HAVE ON A TYPICAL DAY: PATIENT DOES NOT DRINK

## 2024-07-19 ASSESSMENT — ACTIVITIES OF DAILY LIVING (ADL): LACK_OF_TRANSPORTATION: NO

## 2024-07-19 NOTE — ANESTHESIA PREPROCEDURE EVALUATION
Patient: Josselyn Mcarthur    Evaluation Method: In-person visit    Procedure Information    Date: 24  Procedure: Labor Consult         Relevant Problems   Anesthesia  No hx of general anesthesia       Cardiac   (+) Chronic hypertension      Pulmonary   (+) Mild asthma (HHS-HCC) (Hasn't used albuterol in 10-15 years )      Neuro (within normal limits)      GI (within normal limits)      /Renal (within normal limits)      Liver (within normal limits)      Endocrine (within normal limits)      Hematology   (+) Anemia during pregnancy in third trimester (HHS-HCC)      Musculoskeletal (within normal limits)      GYN   (+) Supervision of other high risk pregnancy, antepartum (HHS-HCC)       Clinical information reviewed:   Tobacco  Allergies  Meds   Med Hx  Surg Hx   Fam Hx          NPO Detail:  No data recorded     OB/Gyn Evaluation    Present Pregnancy    Patient is pregnant now.  (+) , trial of labor after    Obstetric History    (+)  section              Physical Exam    Airway  Mallampati: IV  Neck ROM: full     Cardiovascular   Rhythm: regular  Rate: normal     Dental     Comments: Patient has tongue ring, informed she would have to take it out if we go to .  Patient understands and says its easy to remove   Pulmonary   Breath sounds clear to auscultation     Abdominal            Anesthesia Plan    History of general anesthesia?: no  History of complications of general anesthesia?: unknown/emergency    ASA 3     epidural     Current smoker: daily marijuana smoker, quit at start of pregnancy.    Anesthetic plan and risks discussed with patient.    Plan discussed with resident and attending.

## 2024-07-19 NOTE — ANESTHESIA PREPROCEDURE EVALUATION
Patient: Josselyn Mcarthur    Evaluation Method: In-person visit    Procedure Information    Date: 24  Procedure: Labor Consult         Relevant Problems   Anesthesia  No hx of general anesthesia       Cardiac   (+) Chronic hypertension      Pulmonary   (+) Mild asthma ([facility]HS-HCC) (Hasn't used albuterol in 10-15 years )      Neuro (within normal limits)      GI (within normal limits)      /Renal (within normal limits)      Liver (within normal limits)      Endocrine (within normal limits)   (+) BMI 40.0-44.9, adult (Multi)      Hematology   (+) Anemia during pregnancy in third trimester (HHS-HCC)      Musculoskeletal (within normal limits)      GYN   (+) Supervision of other high risk pregnancy, antepartum (HHS-HCC)      Gravid and    (+) History of postpartum hemorrhage   (+) Hx of  section       Clinical information reviewed:   Tobacco  Allergies  Meds   Med Hx  Surg Hx   Fam Hx          NPO Detail:  No data recorded     OB/Gyn Evaluation    Present Pregnancy    Patient is pregnant now.  (+) , trial of labor after    Obstetric History    (+)  section              Physical Exam    Airway  Mallampati: IV  Neck ROM: full     Cardiovascular   Rhythm: regular  Rate: normal     Dental     Comments: Patient has tongue ring, informed she would have to take it out if we go to .  Patient understands and says its easy to remove   Pulmonary   Breath sounds clear to auscultation     Abdominal            Anesthesia Plan    History of general anesthesia?: no  History of complications of general anesthesia?: unknown/emergency    ASA 3     epidural     Current smoker: daily marijuana smoker, quit at start of pregnancy.    Anesthetic plan and risks discussed with patient.    Plan discussed with resident and attending.

## 2024-07-19 NOTE — H&P
Obstetrical Admission History and Physical    Assessment/Plan    Josselyn Mcarthur is a 28 y.o.  at 39w0d, ALEXANDER: 2024, by Ultrasound admitted for TOLAC IOL in s/o cHTN.    TOLAC IOL  Admitted, consented, scanned, cephalic  IOL started with CRB placement, for pitocin now  Epidural as requested  GBS negative  Fetus with accelerated interval growth: EFW 3486g 96%ile, AC 97%ile at US  -> extrapolated to 4200g  CEFM, currently Cat I  We discussed the risks of RCD including the surgical risks (bleeding, infection, injury to adjacent organs, transfusion) as well as the implications for future pregnancies (subsequent delivery by RCD, risk of invasive placenta/accreta).  We also reviewed the risks of TOLAC including the risk of uterine rupture of ~0.5-0.9% and a 10-15% risk of adverse maternal or  outcome in the context of rupture. Strongly prefers TOLAC and consent signed.  Discussed the risk of shoulder dystocia with pt given LGA growth pattern with extrapolated EFW of 4200g and [place] to 8#8. Would not recommend C/S given the EFW <5000g however we did discuss risk of shoulder dystocia and potential maneuvers to resolve as well as potential outcomes including lack of oxygen to baby while we resolve dystocia, possible nerve damage and arm fractures.  Pt aware and agrees to proceed with induction of labor.    cHTN  not on meds, normotensive on admission  took amlodipine 5mg in past pregnancy  HELLP labs on admission pending    Pregnancy notables:   h/o vacuum assisted LTCS d/t NRFHT remote from birth  h/o PPH: EBL 1850mL, received TXA & cyto. T&C x1U RBC  BMI 45, 1hrGTT wnl  Anemia, Hgb 10.8 on     Postpartum planning:  Contraception: post placental liletta    Fetal status  CEFM with high baseline (160), consistent with prior NSTS and doppler tones in office on chart review  GBS negative, no indication for ppx    Patient discussed with Dr. Justin Meléndez MD,  MS4  OBGYN    Subjective   Patient feeling well, ready for induction. Denies ctx, LOF, VB, or decreased fetal movement. Denies HA, vision changes, CP, SOB, or RUQ pain.    Pregnancy notable for:  Medical Problems       Problem List       BMI 40.0-44.9, adult (Multi)    Overview Addendum 5/21/2024  7:26 AM by PEPE Vuong     Pre-preg BMI 43  A1C at new OB:   Fetal surveillance BMI >40 at NOB:  Growth US at 30 (43%) and 36 wks  BPP or NST weekly 34 wks to delivery    Timing of delivery: 39 0/7 - 39 6/7 wks  *BMI >= 50 at any time in pregnancy: Deliver at Level 4           Supervision of other high risk pregnancy, antepartum ([facility])    Overview Addendum 7/8/2024 12:25 PM by Gretel Scherer MD     Desired provider in labor: [] CNM  [x] Physician  [x] Dated by:  12w US  [x] Initial BMI: 43  [x] Prenatal Labs:  WNL except varicella nonimmue  [x] Rh status: B+  [x] Genetic Screening:  declined   [x] Baby ASA: prescribed 1/15    [x] Anatomy US:  [x] 1hr GCT at 24-28wks: drawn 4/11   [] 3 hr GTT (if indicated):  [] Fetal Surveillance (if indicated): weekly NST starting at 34 wga     [] Tdap (27-36wks): declined   [x] Flu Shot: declined 1/15  [] COVID vaccine:     [] Breastfeeding:  [] Pain management during labor:   [x] Postpartum Birth control method: nexplanon     [x] GBS at 36 wks: collected 7/8  [x] 39 weeks discussion of IOL vs. Expectant management: scheduled IOL on 7/19  [x] Mode of delivery: TOLAC              Chronic hypertension    Overview Addendum 4/12/2024  8:14 AM by PEPE Vuong     cHTN was on on amlodipine 5mg daily with 2022 birth  HELLP labs ordered at new OB; WNL , p/c ratio at 25 0.13   Normotensive at home and in clinic    Growth US at 30 and 36 wks  Timing of delivery: 38 0/7 - 39 6/7  Expectant management beyond 39 0/7 weeks only with careful consideration of risks/benefits & with appropriate surveillance           History of postpartum hemorrhage    Overview  Signed 2023  3:55 PM by PEPE Vuong     : 1850ml EBL, TXA/cyto          Hx of  section    Overview Addendum 2024 12:09 PM by Gretel Scherer MD     LTCS  due to NRFHT remote from birth     MFMU 48.6% NEEDS MD consult based on 268lb pre-preg weight. Desires TOLAC.     Will need to be TXC pRBC for induction          Maternal varicella, non-immune ([facility])    Overview Signed 2023  8:05 AM by PEPE Vuong     Offer varivax PP         Anemia during pregnancy in third trimester ([facility])    Overview Signed 2024  6:59 PM by Aliza Harden MD     -last Hgb 10.7, normal Iron and TIBC, normal Hg ID  - currently increased iron rich foods in diet          Large fetus causing disproportion, antepartum ([facility])    Overview Signed 2024  4:24 PM by PEPE Vuong     @36w: Accelerated interval fetal growth with EFW at 96%ile and AC at 97%ile  BPP 8/8, normal amniotic fluid  Recommend weekly  testing               Obstetrical History   OB History    Para Term  AB Living   2 1 1     1   SAB IAB Ectopic Multiple Live Births           1      # Outcome Date GA Lbr José Miguel/2nd Weight Sex Type Anes PTL Lv   2 Current            1 Term 22 38w3d   F CS-LTranv   ABA      Complications: Hemorrhage       Past Medical History  Past Medical History:   Diagnosis Date    Asthma ([facility])     well controlled, denies hospitalization/intubation    Chronic hypertension         Past Surgical History   Past Surgical History:   Procedure Laterality Date     SECTION, LOW TRANSVERSE  2022    NRFHT remote from delivery       Social History  Social History     Tobacco Use    Smoking status: Never    Smokeless tobacco: Never   Substance Use Topics    Alcohol use: Not Currently     Substance and Sexual Activity   Drug Use Not Currently    Types: Marijuana       Allergies  Patient has no known allergies.      Medications  Medications Prior to Admission   Medication Sig Dispense Refill Last Dose    ferrous gluconate 324 (38 Fe) mg tablet Take 1 tablet (38 mg of iron) by mouth once a day on Monday, Wednesday, and Friday. 12 tablet 11 Past Month    WesTab Plus 27 mg iron- 1 mg tablet    Past Month    doxylamine (Unisom, doxylamine,) 25 mg tablet Take 0.5 tablets (12.5 mg) by mouth as needed at bedtime for nausea. 30 tablet 1        Objective    Last Vitals  Temp Pulse Resp BP MAP O2 Sat   36.7 °C (98.1 °F) 95 18 133/80   98 %     Physical Examination  General: no acute distress  HEENT: normocephalic, atraumatic  Heart: warm and well perfused  Lungs: breathing comfortably on room air  Abdomen: gravid  Extremities: moving all extremities  Neuro: awake and conversant  Psych: appropriate mood and affect  SVE: 0.5/30/high  Patient was placed in dorsal lithotomy position, a speculum was placed, and a cervical ripening balloon was guided through the external and internal cervical os with a ring forceps. The balloon was inflated with 60cc of normal saline. Patient tolerated the procedure well.    FHT: 160/mod/+accel/-decel  [place]: rare irregular ctx  BSUS: cephalic, anterior placenta    Lab Review  Labs in chart have been reviewed.

## 2024-07-19 NOTE — PROGRESS NOTES
Intrapartum Progress Note    Assessment/Plan     Josselyn Mcarthur is a 28 y.o.  at 39w0d, ALEXANDER: 2024, by Ultrasound admitted for TOLAC IOL in s/o cHTN.     TOLAC IOL  Admitted, consented, scanned, cephalic  S/p CRB, pitocin initiated at 1400. AROM'd for clear at 1830  Epidural as requested  GBS negative  Fetus with accelerated interval growth: EFW 3486g 96%ile, AC 97%ile at US  -> extrapolated to 4200g  CEFM, currently Cat I  S/p TOLAC counseling and consents signed on admission.   S/p shoulder dystocia counseling on admission. Pt aware and agrees to proceed with induction of labor.     cHTN  not on meds, normotensive to mild range currently  took amlodipine 5mg in past pregnancy  HELLP labs on admission pending     Pregnancy notables:   h/o LTCS d/t NRFHT remote from birth  h/o PPH: EBL 1850mL, received TXA & cyto. T&C x1U RBC  BMI 45, 1hr GTT wnl  Anemia, Hgb 10.3 on admission     Postpartum planning:  Contraception: post placental liletta     Fetal status  CEFM with high baseline (160), consistent with prior NSTS and doppler tones in office on chart review  GBS negative, no indication for ppx    Seen and discussed with Dr. Strickland.     Azucena Matute MD   PGY-2, Labor and Delivery    Active Problems:    Mild asthma ([facility])    Pregnancy Problems (from 25 to present)       Problem Noted Resolved    Large fetus causing disproportion, antepartum ([facility]) 2024 by PEPE Vuong No    Priority:  Medium      Overview Signed 2024  4:24 PM by PEPE Vuong     @36w: Accelerated interval fetal growth with EFW at 96%ile and AC at 97%ile  BPP 8/8, normal amniotic fluid  Recommend weekly  testing         Anemia during pregnancy in third trimester ([facility]) 2024 by Aliza Harden MD No    Priority:  Medium      Overview Signed 2024  6:59 PM by Aliza Harden MD     -last Hgb 10.7, normal Iron and TIBC, normal Hg ID  - currently  increased iron rich foods in diet          Maternal varicella, non-immune ([facility]) 2023 by PEPE Vuong No    Priority:  Medium      Overview Signed 2023  8:05 AM by PEPE Vuong     Offer varivax PP         BMI 40.0-44.9, adult ([facility]) 2023 by PEPE Vuong No    Priority:  Medium      Overview Addendum 2024  7:26 AM by PEPE Vuong     Pre-preg BMI 43  A1C at new OB:   Fetal surveillance BMI >40 at NOB:  Growth US at 30 (43%) and 36 wks  BPP or NST weekly 34 wks to delivery    Timing of delivery: 39 0/7 - 39 6/7 wks  *BMI >= 50 at any time in pregnancy: Deliver at Level 4           Supervision of other high risk pregnancy, antepartum ([facility]) 2023 by PEPE Vuong No    Priority:  Medium      Overview Addendum 2024 12:25 PM by Gretel Scherer MD     Desired provider in labor: [] CNM  [x] Physician  [x] Dated by:  12w US  [x] Initial BMI: 43  [x] Prenatal Labs:  WNL except varicella nonimmue  [x] Rh status: B+  [x] Genetic Screening:  declined   [x] Baby ASA: prescribed 1/15    [x] Anatomy US:  [x] 1hr GCT at 24-28wks: drawn    [] 3 hr GTT (if indicated):  [] Fetal Surveillance (if indicated): weekly NST starting at 34 wga     [] Tdap (27-36wks): declined   [x] Flu Shot: declined 1/15  [] COVID vaccine:     [] Breastfeeding:  [] Pain management during labor:   [x] Postpartum Birth control method: nexplanon     [x] GBS at 36 wks: collected   [x] 39 weeks discussion of IOL vs. Expectant management: scheduled IOL on   [x] Mode of delivery: TOLAC              History of postpartum hemorrhage 2023 by PEPE Vuong No    Priority:  Medium      Overview Signed 2023  3:55 PM by PEPE Vuong     : 1850ml EBL, TXA/cyto          Hx of  section 2023 by PEPE Vuong No    Priority:  Medium       Overview Addendum 7/8/2024 12:09 PM by Gretel Scherer MD     LTCS 2022 due to NRFHT remote from birth     MFMU 48.6% NEEDS MD consult based on 268lb pre-preg weight. Desires TOLAC.     Will need to be TXC pRBC for induction                  Subjective   Seen at bedside in NAD. Denies PEC symptoms currently.    Objective   Last Vitals:  Temp Pulse Resp BP MAP Pulse Ox   36.4 °C (97.5 °F) 87 18 138/76   98 %     Vitals Min/Max Last 24 Hours:  Temp  Min: 36.4 °C (97.5 °F)  Max: 36.7 °C (98.1 °F)  Pulse  Min: 87  Max: 96  Resp  Min: 18  Max: 18  BP  Min: 133/80  Max: 150/78    Intake/Output:  No intake or output data in the 24 hours ending 07/19/24 1814    Physical Examination:  General: Lying in bed in NAD  Obstetric:   FHT: 135 bpm, moderate variability, + accels, - decels  Skin: No rashes/lesions/erythema  Neuro: Awake, alert, conversational  CV: Regular rate, warm and well perfused  Respiratory: Even and unlabored on RA  Extremities: No edema, discoloration, or pain in BLE  Psych: appropriate mood and affect     Lab Review:  Labs in chart were reviewed.

## 2024-07-19 NOTE — ANESTHESIA PROCEDURE NOTES
Epidural Block    Patient location during procedure: OB  Start time: 7/19/2024 6:54 PM  End time: 7/19/2024 7:38 PM  Reason for block: labor analgesia  Staffing  Performed: CRNA   Authorized by: JAYA Calle    Performed by: JAYA Calle    Preanesthetic Checklist  Completed: patient identified, IV checked, risks and benefits discussed, surgical consent, pre-op evaluation, timeout performed and sterile techniques followed  Block Timeout  RN/Licensed healthcare professional reads aloud to the Anesthesia provider and entire team: Patient identity, procedure with side and site, patient position, and as applicable the availability of implants/special equipment/special requirements.  Patient on coagulant treatment: no  Timeout performed at: 7/19/2024 6:55 PM  Block Placement  Patient position: sitting  Prep: ChloraPrep  Sterility prep: cap, drape, hand, gloves and mask  Sedation level: no sedation  Patient monitoring: blood pressure and continuous pulse oximetry  Approach: midline  Local numbing: lidocaine 1% to skin and subcutaneous tissues  Vertebral space: lumbar  Lumbar location: L3-L4  Epidural  Loss of resistance technique: saline  Guidance: landmark technique        Needle  Needle type: Tuohy   Needle gauge: 17  Needle length: 10.2 cm  Needle insertion depth: 7.5 cm  Catheter type: multi-orifice  Catheter size: 21 G  Catheter at skin depth: 12.5 cm  Catheter securement method: clear occlusive dressing and liquid medical adhesive      PCEA  Medication concentration used: 0.044% Bupivacaine with 1.25 mcg/mL Fentanyl and 1:022240 Epinephrine  Dose (mL): 10  Lockout (minutes): 15  1-Hour Limit (boluses/hr): 4  Basal Rate: 14        Assessment  Sensory level: other (T6 on left, T10 on right)  Block outcome: patient comfortable  Number of attempts: 1  Procedure assessment: patient tolerated procedure well with no immediate complications

## 2024-07-20 LAB
ABO GROUP (TYPE) IN BLOOD: NORMAL
ANTIBODY SCREEN: NORMAL
BASE EXCESS BLDCOA CALC-SCNC: -5.5 MMOL/L (ref -10.8–-0.5)
BASE EXCESS BLDCOV CALC-SCNC: -4.6 MMOL/L (ref -8.1–-0.5)
BODY TEMPERATURE: 37 DEGREES CELSIUS
BODY TEMPERATURE: 37 DEGREES CELSIUS
HCO3 BLDCOA-SCNC: 25.3 MMOL/L (ref 15–29)
HCO3 BLDCOV-SCNC: 22.1 MMOL/L (ref 16–26)
INHALED O2 CONCENTRATION: 21 %
INHALED O2 CONCENTRATION: 21 %
OXYHGB MFR BLDCOA: 20.8 % (ref 94–98)
OXYHGB MFR BLDCOV: 64.6 % (ref 94–98)
PCO2 BLDCOA: 76 MM HG (ref 31–75)
PCO2 BLDCOV: 46 MM HG (ref 22–53)
PH BLDCOA: 7.13 PH (ref 7.08–7.39)
PH BLDCOV: 7.29 PH (ref 7.19–7.47)
PO2 BLDCOA: 13 MM HG (ref 5–31)
PO2 BLDCOV: 33 MM HG (ref 13–37)
RH FACTOR (ANTIGEN D): NORMAL
SAO2 % BLDCOA: 21 % (ref 3–69)
SAO2 % BLDCOV: 66 % (ref 16–84)

## 2024-07-20 PROCEDURE — 2500000005 HC RX 250 GENERAL PHARMACY W/O HCPCS

## 2024-07-20 PROCEDURE — 2500000004 HC RX 250 GENERAL PHARMACY W/ HCPCS (ALT 636 FOR OP/ED)

## 2024-07-20 PROCEDURE — 2720000007 HC OR 272 NO HCPCS

## 2024-07-20 PROCEDURE — C1765 ADHESION BARRIER: HCPCS

## 2024-07-20 PROCEDURE — 2780000003 HC OR 278 NO HCPCS

## 2024-07-20 PROCEDURE — 3700000014 HC AN EPIDURAL BLOCK CHARGE: Performed by: STUDENT IN AN ORGANIZED HEALTH CARE EDUCATION/TRAINING PROGRAM

## 2024-07-20 PROCEDURE — 7210000002 HC LABOR PER HOUR

## 2024-07-20 PROCEDURE — 36415 COLL VENOUS BLD VENIPUNCTURE: CPT

## 2024-07-20 PROCEDURE — 82805 BLOOD GASES W/O2 SATURATION: CPT | Performed by: STUDENT IN AN ORGANIZED HEALTH CARE EDUCATION/TRAINING PROGRAM

## 2024-07-20 PROCEDURE — 51701 INSERT BLADDER CATHETER: CPT

## 2024-07-20 PROCEDURE — P9045 ALBUMIN (HUMAN), 5%, 250 ML: HCPCS | Mod: JZ

## 2024-07-20 PROCEDURE — 2500000001 HC RX 250 WO HCPCS SELF ADMINISTERED DRUGS (ALT 637 FOR MEDICARE OP)

## 2024-07-20 PROCEDURE — 7100000016 HC LABOR RECOVERY PER HOUR: Performed by: STUDENT IN AN ORGANIZED HEALTH CARE EDUCATION/TRAINING PROGRAM

## 2024-07-20 PROCEDURE — 2720000007 HC OR 272 NO HCPCS: Performed by: STUDENT IN AN ORGANIZED HEALTH CARE EDUCATION/TRAINING PROGRAM

## 2024-07-20 PROCEDURE — 1210000001 HC SEMI-PRIVATE ROOM DAILY

## 2024-07-20 PROCEDURE — 59514 CESAREAN DELIVERY ONLY: CPT | Performed by: STUDENT IN AN ORGANIZED HEALTH CARE EDUCATION/TRAINING PROGRAM

## 2024-07-20 PROCEDURE — 86901 BLOOD TYPING SEROLOGIC RH(D): CPT

## 2024-07-20 RX ORDER — ONDANSETRON 4 MG/1
4 TABLET, FILM COATED ORAL EVERY 6 HOURS PRN
Status: DISCONTINUED | OUTPATIENT
Start: 2024-07-20 | End: 2024-07-23 | Stop reason: HOSPADM

## 2024-07-20 RX ORDER — OXYCODONE HYDROCHLORIDE 5 MG/1
5 TABLET ORAL EVERY 4 HOURS PRN
Status: DISCONTINUED | OUTPATIENT
Start: 2024-07-21 | End: 2024-07-23 | Stop reason: HOSPADM

## 2024-07-20 RX ORDER — ONDANSETRON HYDROCHLORIDE 2 MG/ML
4 INJECTION, SOLUTION INTRAVENOUS EVERY 6 HOURS PRN
Status: DISCONTINUED | OUTPATIENT
Start: 2024-07-20 | End: 2024-07-23 | Stop reason: HOSPADM

## 2024-07-20 RX ORDER — BISACODYL 10 MG/1
10 SUPPOSITORY RECTAL DAILY PRN
Status: DISCONTINUED | OUTPATIENT
Start: 2024-07-20 | End: 2024-07-23 | Stop reason: HOSPADM

## 2024-07-20 RX ORDER — DIPHENHYDRAMINE HCL 25 MG
25 CAPSULE ORAL EVERY 4 HOURS PRN
Status: DISCONTINUED | OUTPATIENT
Start: 2024-07-20 | End: 2024-07-23 | Stop reason: HOSPADM

## 2024-07-20 RX ORDER — KETOROLAC TROMETHAMINE 30 MG/ML
INJECTION, SOLUTION INTRAMUSCULAR; INTRAVENOUS AS NEEDED
Status: DISCONTINUED | OUTPATIENT
Start: 2024-07-20 | End: 2024-07-20

## 2024-07-20 RX ORDER — FENTANYL CITRATE 50 UG/ML
INJECTION, SOLUTION INTRAMUSCULAR; INTRAVENOUS AS NEEDED
Status: DISCONTINUED | OUTPATIENT
Start: 2024-07-20 | End: 2024-07-20

## 2024-07-20 RX ORDER — PHENYLEPHRINE HCL IN 0.9% NACL 0.4MG/10ML
SYRINGE (ML) INTRAVENOUS AS NEEDED
Status: DISCONTINUED | OUTPATIENT
Start: 2024-07-20 | End: 2024-07-20

## 2024-07-20 RX ORDER — ONDANSETRON HYDROCHLORIDE 2 MG/ML
INJECTION, SOLUTION INTRAVENOUS AS NEEDED
Status: DISCONTINUED | OUTPATIENT
Start: 2024-07-20 | End: 2024-07-20

## 2024-07-20 RX ORDER — OXYTOCIN/0.9 % SODIUM CHLORIDE 30/500 ML
60 PLASTIC BAG, INJECTION (ML) INTRAVENOUS ONCE AS NEEDED
Status: DISCONTINUED | OUTPATIENT
Start: 2024-07-20 | End: 2024-07-23 | Stop reason: HOSPADM

## 2024-07-20 RX ORDER — CARBOPROST TROMETHAMINE 250 UG/ML
250 INJECTION, SOLUTION INTRAMUSCULAR ONCE AS NEEDED
Status: DISCONTINUED | OUTPATIENT
Start: 2024-07-20 | End: 2024-07-23 | Stop reason: HOSPADM

## 2024-07-20 RX ORDER — ENOXAPARIN SODIUM 100 MG/ML
60 INJECTION SUBCUTANEOUS EVERY 24 HOURS
Status: DISCONTINUED | OUTPATIENT
Start: 2024-07-21 | End: 2024-07-23 | Stop reason: HOSPADM

## 2024-07-20 RX ORDER — POLYETHYLENE GLYCOL 3350 17 G/17G
17 POWDER, FOR SOLUTION ORAL 2 TIMES DAILY PRN
Status: DISCONTINUED | OUTPATIENT
Start: 2024-07-20 | End: 2024-07-23 | Stop reason: HOSPADM

## 2024-07-20 RX ORDER — LABETALOL HYDROCHLORIDE 5 MG/ML
20 INJECTION, SOLUTION INTRAVENOUS ONCE AS NEEDED
Status: DISCONTINUED | OUTPATIENT
Start: 2024-07-20 | End: 2024-07-23 | Stop reason: HOSPADM

## 2024-07-20 RX ORDER — IBUPROFEN 600 MG/1
600 TABLET ORAL EVERY 6 HOURS
Status: DISCONTINUED | OUTPATIENT
Start: 2024-07-21 | End: 2024-07-23 | Stop reason: HOSPADM

## 2024-07-20 RX ORDER — OXYCODONE HYDROCHLORIDE 5 MG/1
10 TABLET ORAL EVERY 4 HOURS PRN
Status: DISCONTINUED | OUTPATIENT
Start: 2024-07-21 | End: 2024-07-23 | Stop reason: HOSPADM

## 2024-07-20 RX ORDER — DEXMEDETOMIDINE IN 0.9 % NACL 20 MCG/5ML
SYRINGE (ML) INTRAVENOUS AS NEEDED
Status: DISCONTINUED | OUTPATIENT
Start: 2024-07-20 | End: 2024-07-20

## 2024-07-20 RX ORDER — SIMETHICONE 80 MG
80 TABLET,CHEWABLE ORAL 4 TIMES DAILY PRN
Status: DISCONTINUED | OUTPATIENT
Start: 2024-07-20 | End: 2024-07-23 | Stop reason: HOSPADM

## 2024-07-20 RX ORDER — MORPHINE SULFATE 0.5 MG/ML
INJECTION, SOLUTION EPIDURAL; INTRATHECAL; INTRAVENOUS AS NEEDED
Status: DISCONTINUED | OUTPATIENT
Start: 2024-07-20 | End: 2024-07-20

## 2024-07-20 RX ORDER — LIDOCAINE 560 MG/1
1 PATCH PERCUTANEOUS; TOPICAL; TRANSDERMAL
Status: DISCONTINUED | OUTPATIENT
Start: 2024-07-20 | End: 2024-07-23 | Stop reason: HOSPADM

## 2024-07-20 RX ORDER — TRANEXAMIC ACID 100 MG/ML
1000 INJECTION, SOLUTION INTRAVENOUS ONCE AS NEEDED
Status: DISCONTINUED | OUTPATIENT
Start: 2024-07-20 | End: 2024-07-23 | Stop reason: HOSPADM

## 2024-07-20 RX ORDER — HYDRALAZINE HYDROCHLORIDE 20 MG/ML
5 INJECTION INTRAMUSCULAR; INTRAVENOUS ONCE AS NEEDED
Status: DISCONTINUED | OUTPATIENT
Start: 2024-07-20 | End: 2024-07-23 | Stop reason: HOSPADM

## 2024-07-20 RX ORDER — KETOROLAC TROMETHAMINE 30 MG/ML
30 INJECTION, SOLUTION INTRAMUSCULAR; INTRAVENOUS EVERY 6 HOURS
Status: COMPLETED | OUTPATIENT
Start: 2024-07-20 | End: 2024-07-20

## 2024-07-20 RX ORDER — ALBUMIN HUMAN 50 G/1000ML
SOLUTION INTRAVENOUS AS NEEDED
Status: DISCONTINUED | OUTPATIENT
Start: 2024-07-20 | End: 2024-07-20

## 2024-07-20 RX ORDER — FAMOTIDINE 10 MG/ML
INJECTION INTRAVENOUS AS NEEDED
Status: DISCONTINUED | OUTPATIENT
Start: 2024-07-20 | End: 2024-07-20

## 2024-07-20 RX ORDER — MISOPROSTOL 200 UG/1
800 TABLET ORAL ONCE AS NEEDED
Status: DISCONTINUED | OUTPATIENT
Start: 2024-07-20 | End: 2024-07-23 | Stop reason: HOSPADM

## 2024-07-20 RX ORDER — CEFAZOLIN 1 G/1
INJECTION, POWDER, FOR SOLUTION INTRAVENOUS AS NEEDED
Status: DISCONTINUED | OUTPATIENT
Start: 2024-07-20 | End: 2024-07-20

## 2024-07-20 RX ORDER — LOPERAMIDE HYDROCHLORIDE 2 MG/1
4 CAPSULE ORAL EVERY 2 HOUR PRN
Status: DISCONTINUED | OUTPATIENT
Start: 2024-07-20 | End: 2024-07-23 | Stop reason: HOSPADM

## 2024-07-20 RX ORDER — SODIUM CHLORIDE, SODIUM LACTATE, POTASSIUM CHLORIDE, CALCIUM CHLORIDE 600; 310; 30; 20 MG/100ML; MG/100ML; MG/100ML; MG/100ML
125 INJECTION, SOLUTION INTRAVENOUS CONTINUOUS
Status: DISCONTINUED | OUTPATIENT
Start: 2024-07-20 | End: 2024-07-20

## 2024-07-20 RX ORDER — DIPHENHYDRAMINE HYDROCHLORIDE 50 MG/ML
25 INJECTION INTRAMUSCULAR; INTRAVENOUS EVERY 4 HOURS PRN
Status: DISCONTINUED | OUTPATIENT
Start: 2024-07-20 | End: 2024-07-23 | Stop reason: HOSPADM

## 2024-07-20 RX ORDER — AZITHROMYCIN 100 MG/ML
INJECTION, POWDER, LYOPHILIZED, FOR SOLUTION INTRAVENOUS AS NEEDED
Status: DISCONTINUED | OUTPATIENT
Start: 2024-07-20 | End: 2024-07-20

## 2024-07-20 RX ORDER — METHYLERGONOVINE MALEATE 0.2 MG/ML
0.2 INJECTION INTRAVENOUS ONCE AS NEEDED
Status: DISCONTINUED | OUTPATIENT
Start: 2024-07-20 | End: 2024-07-23 | Stop reason: HOSPADM

## 2024-07-20 RX ORDER — NALOXONE HYDROCHLORIDE 0.4 MG/ML
0.1 INJECTION, SOLUTION INTRAMUSCULAR; INTRAVENOUS; SUBCUTANEOUS EVERY 5 MIN PRN
Status: DISCONTINUED | OUTPATIENT
Start: 2024-07-20 | End: 2024-07-23 | Stop reason: HOSPADM

## 2024-07-20 RX ORDER — ADHESIVE BANDAGE
10 BANDAGE TOPICAL
Status: DISCONTINUED | OUTPATIENT
Start: 2024-07-20 | End: 2024-07-23 | Stop reason: HOSPADM

## 2024-07-20 RX ORDER — LIDOCAINE HCL/EPINEPHRINE/PF 2%-1:200K
VIAL (ML) INJECTION AS NEEDED
Status: DISCONTINUED | OUTPATIENT
Start: 2024-07-20 | End: 2024-07-20

## 2024-07-20 RX ORDER — ACETAMINOPHEN 325 MG/1
975 TABLET ORAL EVERY 6 HOURS
Status: DISCONTINUED | OUTPATIENT
Start: 2024-07-20 | End: 2024-07-23 | Stop reason: HOSPADM

## 2024-07-20 RX ORDER — HYDROMORPHONE HYDROCHLORIDE 1 MG/ML
0.2 INJECTION, SOLUTION INTRAMUSCULAR; INTRAVENOUS; SUBCUTANEOUS EVERY 5 MIN PRN
Status: DISCONTINUED | OUTPATIENT
Start: 2024-07-20 | End: 2024-07-23 | Stop reason: HOSPADM

## 2024-07-20 RX ORDER — OXYTOCIN 10 [USP'U]/ML
10 INJECTION, SOLUTION INTRAMUSCULAR; INTRAVENOUS ONCE AS NEEDED
Status: DISCONTINUED | OUTPATIENT
Start: 2024-07-20 | End: 2024-07-23 | Stop reason: HOSPADM

## 2024-07-20 RX ORDER — ACETAMINOPHEN 120 MG/1
SUPPOSITORY RECTAL AS NEEDED
Status: DISCONTINUED | OUTPATIENT
Start: 2024-07-20 | End: 2024-07-20

## 2024-07-20 RX ORDER — NIFEDIPINE 10 MG/1
10 CAPSULE ORAL ONCE AS NEEDED
Status: DISCONTINUED | OUTPATIENT
Start: 2024-07-20 | End: 2024-07-23 | Stop reason: HOSPADM

## 2024-07-20 ASSESSMENT — PAIN DESCRIPTION - DESCRIPTORS
DESCRIPTORS: ACHING
DESCRIPTORS: DISCOMFORT

## 2024-07-20 ASSESSMENT — PAIN SCALES - GENERAL
PAINLEVEL_OUTOF10: 0 - NO PAIN
PAINLEVEL_OUTOF10: 0 - NO PAIN
PAINLEVEL_OUTOF10: 5 - MODERATE PAIN
PAINLEVEL_OUTOF10: 0 - NO PAIN
PAINLEVEL_OUTOF10: 6
PAINLEVEL_OUTOF10: 0 - NO PAIN
PAINLEVEL_OUTOF10: 6
PAINLEVEL_OUTOF10: 2
PAINLEVEL_OUTOF10: 0 - NO PAIN
PAINLEVEL_OUTOF10: 5 - MODERATE PAIN
PAINLEVEL_OUTOF10: 0 - NO PAIN
PAINLEVEL_OUTOF10: 4
PAINLEVEL_OUTOF10: 0 - NO PAIN
PAINLEVEL_OUTOF10: 4

## 2024-07-20 NOTE — ANESTHESIA POSTPROCEDURE EVALUATION
Patient: Josselyn Mcarthur    Procedure Summary       Date: 24 Room / Location: [facility] OB    Anesthesia Start:  Anesthesia Stop: 24    Procedure: OBGYN Delivery  Section Diagnosis:     Surgeons: Raya Strickland MD Responsible Provider: Keshia Meyers DO    Anesthesia Type: epidural ASA Status: 3            Anesthesia Type: epidural    Vitals Value Taken Time   /74 24 0510   Temp 36 °C (96.8 °F) 24 0508   Pulse 88 24 0510   Resp 18 24 0511   SpO2 99 % 24 0508       Anesthesia Post Evaluation    Patient location during evaluation: floor  Patient participation: complete - patient participated  Level of consciousness: awake and alert  Pain management: satisfactory to patient  Airway patency: patent  Cardiovascular status: acceptable and blood pressure returned to baseline  Respiratory status: acceptable, room air and spontaneous ventilation  Hydration status: acceptable  Postoperative Nausea and Vomiting: none        No notable events documented.

## 2024-07-20 NOTE — PROGRESS NOTES
Brief Note    To bedside for SVE.  Unchanged at 3/50/-2, continue up titration of pitocin per protocol.  Overall reactive & reassuring, continue to monitor closely.   Patient desires to continue NAYE.    Raya Strickland MD

## 2024-07-20 NOTE — L&D DELIVERY NOTE
OB Delivery Note  2024  Josselyn Mcarthur  28 y.o.   , Low Transverse       Gestational Age: 39w1d  /Para:   Quantitative Blood Loss: Admission to Discharge: 1165 mL (2024 11:15 AM - 2024  7:36 AM)    Meron Mcarthurrodo [[id number]]      Labor Events    Sac identifier: Sac 1  Rupture date/time: 2024 1839  Rupture type: Artificial  Fluid odor: None  Labor type: Induced Onset of Labor  Labor allowed to proceed with plans for an attempted vaginal birth?: Yes  Induction: Maddox/EASI, Oxytocin  First cervical ripening date/time: 2024 1340  Induction date/time: 2024 134  Induction indications: Hypertensive Disorder of Pregnancy  Complications: Fetal Intolerance       Labor Event Times    Labor onset date/time: 2024 1402       Placenta    Placenta delivery date/time: 2024 0407  Placenta removal: Manual removal  Placenta appearance: Intact  Placenta disposition: pathology       Cord    Vessels: 3 vessels  Complications: None  Delayed cord clamping?: Yes  Cord clamped date/time: 2024 04:04:00  Cord blood disposition: Discarded  Gases sent?: Yes  Stem cell collection (by provider): No       Lacerations    Episiotomy: None  Perineal laceration: None  Other lacerations?: No  Repair suture: None       Anesthesia    Method: Epidural       Operative Delivery    Forceps attempted?: No  Vacuum extractor attempted?: Yes  Vacuum type: Kiwi  First attempt time vacuum applied: 2024 04:03:00  First attempt time vacuum removed: 2024 04:04:00  Number of pop offs: 0  Number of pulls with vacuum: 1  Failed vacuum delivery?: No       Shoulder Dystocia    Shoulder dystocia present?: No        Delivery    Time head delivered: 2024 04:04:00  Birth date/time: 2024 04:04:00  Delivery type: , Low Transverse   categorization: repeat   priority: routine  Indications for : Fetal Intolerance of Labor, Persistent Category 2  Tracing Remote from Delivery  Incision type: low transverse  Complications: Fetal Intolerance       Resuscitation    Method: Suctioning, Tactile stimulation       Apgars    Living status: Living  Apgar Component Scores:  1 min.:  5 min.:  10 min.:  15 min.:  20 min.:    Skin color:  0  1       Heart rate:  2  2       Reflex irritability:  1  2       Muscle tone:  2  2       Respiratory effort:  2  2       Total:  7  9       Apgars assigned by: RONN BORRERO       Delivery Providers    Delivering clinician: Raya Strickland MD   Provider Role    Deedee Worthy, RN Delivery Nurse    Michelle Hogan, RN Nursery Nurse    Azucena Matute MD Resident                 Operative Dictation    Pre-Operative Diagnosis: NRFHT remote from delivery, Anemia, cHTN, LGA Growth Pattern, H/o  section for NRFHT    Post-Operative Diagnosis: Same    Findings: Moderate adhesive disease noted in the pre-rectus-facial plane. Moderate to dense intrabdominal adhesions with bowel tacked to the anterior abdominal wall. Right fallopian morbidly adherent to the prior hysterotomy scar on the R aspect. Mild bladder adhesive disease. Otherwise normal appearing gravid uterus, left fallopian tube, and ovaries. Amniotic fluid meconium stained, female infant in cephalic presentation    Procedure:   Patient was taken to the OR where epidural anesthesia was redosed.  She was then placed in the dorsal supine position with a left lateral tilt. A walton catheter was placed, SCDs were applied, and a vaginal prep was performed. A pre-procedure time out was performed.  The patient was given prophylactic dose of IV antibiotics. She was then prepped and draped in the usual sterile fashion.     A Pfannenstiel skin incision was made through the skin with the scalpel and then carried through the subcutaneous fat to the underlying fascial layer with sharp dissection and electrosurgery. The fascia was incised on either side of the midline with the scalpel,  and fascia was then dissected off the rectus muscle bilaterally using sharp dissection with electrosurgery. The superior aspect of the incision was grasped, tented up with Kocher clamps and the rectus muscle was dissected off bluntly. The muscles were divided in the midline, moderate adhesive disease was noted between the rectus muscle and fascia, this was  carefully using curved Hall scissors with clear visualization. The peritoneum was identified and then entered bluntly, and incision extended superiorly and inferiorly with good visualization of bladder below. Moderate intra-abdominal adhesions were noted, bowel was noted to be adhered to the anterior abdominal wall and lateral peritoneal adhesions were taken down with electrocautery.  A bladder flap was created with smooth pickups and Metzenbaum scissors. Bladder blade was inserted. The right fallopian tube was adherent to the R aspect of the previous hysterotomy scar.     A low transverse uterine incision was made with the scalpel, the uterine cavity was entered, and the hysterotomy was extended bilaterally with cephalocaudal stretching. A Kiwi vacuum was applied to the fetal head to assist with delivery, there was 1 attempt and NO pop-offs. The infant was then delivered atraumatically, the cord was clamped and cut, and infant was handed off to the awaiting nursing staff. A cord segment and blood sample were collected. The placenta was then expressed. The uterus was exteriorized and cleared of all clot and debris. The uterine incision was repaired using a running stitch of 0-Vicryl. A postplacental Liletta IUD was placed without incident. A second layer of the same suture was placed in an imbricating fashion. Good hemostasis was noted. A small abrasion was noted on the superior aspect of the fundus. Pressure was held and a layer of intercede was placed for adhesive disease prevention and hemostasis. The fimbriated end of the R fallopian tube was noted  to be friable and bleeding while adhered to the R aspect of the current and past hysterotomy. The decision was made to do use electrocautery to achieve hemostasis. It should be noted that this patient is at increased risk of ectopic pregnancy in the future.     The uterus was then placed back inside the pelvis, the gutters were cleared of all clots and debris. The hysterotomy was again evaluated and found to be hemostatic, and the underside of the fascia and bladder and the rectus muscles were also found to be hemostatic. Jacob was applied to bilateral aspects of the hysterotomy. The fascia was closed using a running stitch of 0-PDS. The subcutaneous layer was irrigated, small bleeding vessels were cauterized, and the subcutaneous layer was reapproximated using a running stitch of 2-0 Monocryl. The skin was closed with 4-0 Vicryl.  All counts were correct, the patient tolerated the procedure well. The patient and infant were taken back to the recovery room in stable condition.    Intrauterine Device Inserted: Yes, with  delivery   Intrauterine Device Inserted: IUD Device Type: Liletta    IUD with  Delivery Consent and Procedure Statement:   Consent: The risks, benefits, and alternatives to immediate postplacental intrauterine device insertion were discussed with the patient.  Specifically, we discussed the possible risks of bleeding, infection, perforation, failure, increased risk of ectopic should pregnancy occur, and the higher risk of expulsion.  Written consent was obtained prior to the procedure and is detailed in the patient’s record.      Procedure: The intrauterine device was placed at the fundus using standard technique, and the strings were gently guided down to the internal os using the ring forceps.    Dr. Strickland was present for all key portions of the procedure.      Azucena Matute MD  PGY-2, Labor and Delivery

## 2024-07-20 NOTE — PROGRESS NOTES
L&D Attending Progress Note    To bedside for evaluation of FHTs.  Recurrent late decelerations noted-- patient repositioned and pitocin decreased from 16 to 8 with improvement in tracing.  Discussed with patient that if we are unable to successfully up titrate pitocin, would recommend  & she is amenable.     Raya Strickland MD

## 2024-07-20 NOTE — PROGRESS NOTES
L&D Attending Progress Note    Presented to bedside for evaluation of late decelerations.  Pitocin turned off.  Discussed with patient that we can attempt to re start pitocin in 30 minutes pending FHTs or she may opt to proceed with rCD at any time.  Patient declines continuing with NAYE and opts for rCD with IUD placement in the setting of NRFHT remote from delivery.    Raya Strickland MD

## 2024-07-20 NOTE — DISCHARGE INSTRUCTIONS

## 2024-07-20 NOTE — LACTATION NOTE
Lactation Consultant Note  Lactation Consultation  Reason for Consult: Initial assessment  Consultant Name: Adriana Weir RN, IBCLC    Maternal Information  Has mother  before?: Yes  Previous Maternal Breastfeeding Challenges: Difficult latch, Low milk supply, Exclusive pump and bottle fed  Infant to breast within first 2 hours of birth?: Yes  Exclusive Pump and Bottle Feed: No    Maternal Assessment  Breast Assessment: Medium, Pendulous, Soft, Warm, Compressible (easily expressible bilaterally)  Nipple Assessment: Intact, Erect  [place] Assessment: Normal    Infant Assessment  Infant Behavior: Deep sleep, Content after feeding  Infant Assessment:  (deferred)    Feeding Assessment  Nutrition Source: Breastmilk, Formula (per mother’s request)  Feeding Method: Nursing at the breast, Paced bottle    LATCH TOOL       Breast Pump       Other OB Lactation Tools       Patient Follow-up  Inpatient Lactation Follow-up Needed : Yes  Outpatient Lactation Follow-up: Recommended    Other OB Lactation Documentation  Maternal Risk Factors:  delivery  Infant Risk Factors: High birth weight >3600 g    Recommendations/Summary  Mother states tried to latch infant in football hold on labor and delivery and since has been giving infant formula via bottle. When asked mother's plan/goal, mother states wants to try to breast feed/latch infant and feed formula if needed. Discussed with mother normal  feeding pattern as well as milk supply pattern in the early postpartum period.     I explained to mother the risks formula can have on milk supply and latch. We discussed that exclusive breast feeding is recommended and possible, however if she plans to feed formula in addition to breast milk, I recommended feeding infant at breast first based on feeding cues and then formula after. I also recommended pumping if supplementing with formula to promote adequate milk supply production.      I encouraged mother if she  would like to breast feed to latch infant to breast when showing feeding cues and wake infant to feed at breast if it has been 3 hours since last feed. Infant in a deep sleep state, seems content after recently feeding formula via bottle. I suggested maybe initiating pumping if she plans to move forward with more consistent formula feeding but did suggest to give infant first 24 hours to try to breast feed/latch and usually do not recommend pumping/supplementation at this time.    Upon assessment, mother has very expressible breasts. Discussed and performed hand expression and reviewed characteristics of a deep and proper latch. Recommended trying to latch infant in football hold with pillow support so infant is up to level of breast.    Mother states she purchased a breast pump at home but did not receive one through insurance. I will order a breast pump for mother per her request. Outpatient lactation resources discussed with mother. I encouraged mother to call for any questions, concerns or assistance.

## 2024-07-21 VITALS
TEMPERATURE: 97.2 F | RESPIRATION RATE: 16 BRPM | DIASTOLIC BLOOD PRESSURE: 81 MMHG | WEIGHT: 291.67 LBS | SYSTOLIC BLOOD PRESSURE: 122 MMHG | HEIGHT: 67 IN | OXYGEN SATURATION: 97 % | BODY MASS INDEX: 45.78 KG/M2 | HEART RATE: 97 BPM

## 2024-07-21 LAB
ERYTHROCYTE [DISTWIDTH] IN BLOOD BY AUTOMATED COUNT: 15.4 % (ref 11.5–14.5)
HCT VFR BLD AUTO: 30.5 % (ref 36–46)
HGB BLD-MCNC: 9.5 G/DL (ref 12–16)
MCH RBC QN AUTO: 26.5 PG (ref 26–34)
MCHC RBC AUTO-ENTMCNC: 31.1 G/DL (ref 32–36)
MCV RBC AUTO: 85 FL (ref 80–100)
NRBC BLD-RTO: 0 /100 WBCS (ref 0–0)
PLATELET # BLD AUTO: 207 X10*3/UL (ref 150–450)
RBC # BLD AUTO: 3.59 X10*6/UL (ref 4–5.2)
WBC # BLD AUTO: 8.8 X10*3/UL (ref 4.4–11.3)

## 2024-07-21 PROCEDURE — 2500000001 HC RX 250 WO HCPCS SELF ADMINISTERED DRUGS (ALT 637 FOR MEDICARE OP)

## 2024-07-21 PROCEDURE — 2500000004 HC RX 250 GENERAL PHARMACY W/ HCPCS (ALT 636 FOR OP/ED)

## 2024-07-21 PROCEDURE — 85027 COMPLETE CBC AUTOMATED: CPT

## 2024-07-21 PROCEDURE — 36415 COLL VENOUS BLD VENIPUNCTURE: CPT

## 2024-07-21 PROCEDURE — 1210000001 HC SEMI-PRIVATE ROOM DAILY

## 2024-07-21 ASSESSMENT — PAIN SCALES - GENERAL
PAINLEVEL_OUTOF10: 6
PAINLEVEL_OUTOF10: 0 - NO PAIN
PAINLEVEL_OUTOF10: 0 - NO PAIN

## 2024-07-21 NOTE — PROGRESS NOTES
Postpartum Progress Note    Assessment/Plan   Josselyn Mcarthur is a 28 y.o., , who delivered at 39w1d gestation via rCS for NRFHT remote from delivery and is now postpartum day 1.    s/p LTCS on  c/b moderate adhesive disease with EBL 1100 mL  - continue routine postoperative care  - pain well controlled, transition to PO meds   - Hgb   Results from last 7 days   Lab Units 24  0502 24  1218   HEMOGLOBIN g/dL 9.5* 10.3*     - DVT Score: 5, for ppx lovenox    cHTN  - BP largely WNL on no meds postpartum, 1 mild range BP in past 24 hrs  - Asymptomatic  - HELLP labs negative  - Has BP cuff at home  - Discussed recommendation for 3 day stay, patient agreeable      PPH, EBL 1100 mL  - Moderate adhesive disease, increased risk of ectopic pregnancy, valentín and intercede used  - hgb trend as above  - Asymptomatic, appropriate for home PO iron supplementation   - Continue to monitor for signs and symptoms of bleeding    Maternal Well-Being  - emotional support provided  - bonding with infant     Feeding  - breastfeeding/pumping encouraged; lactation consult prn     Contraception  - post-placental LNG IUD    Dispo  - Anticipate DC PPD3 pending BP control.  - The signs and symptoms of PEC were reviewed with the patient, including unrelenting headache, vision changes/blurred vision, and pain underneath the right breast.   - BP cuff for home for checking BP BID. Pt instructed to call primary OB if SBP > 160 or DBP > 110.  - On discharge, follow up with primary OB in 2-5 days for BP check, 2 weeks for incision check, and 4-6 weeks for postpartum visit.      Principal Problem:     delivery delivered ([facility])  Active Problems:    Chronic hypertension    History of postpartum hemorrhage    Hx of  section    Anemia during pregnancy in third trimester ([facility])    Mild asthma ([facility])    PPH (postpartum hemorrhage) ([facility])    Pregnancy Problems (from 25 to present)       Problem  Noted Resolved    Labor and delivery, indication for care ([facility]) 2024 by Azucena Matute MD No    Priority:  Medium      Large fetus causing disproportion, antepartum ([facility]) 2024 by PEPE Vuong No    Priority:  Medium      Overview Signed 2024  4:24 PM by PEPE Vuong     @36w: Accelerated interval fetal growth with EFW at 96%ile and AC at 97%ile  BPP 8/8, normal amniotic fluid  Recommend weekly  testing         Anemia during pregnancy in third trimester ([facility]) 2024 by Aliza Harden MD No    Priority:  Medium      Overview Signed 2024  6:59 PM by Aliza Harden MD     -last Hgb 10.7, normal Iron and TIBC, normal Hg ID  - currently increased iron rich foods in diet          Maternal varicella, non-immune ([facility]) 2023 by PEPE Vuong No    Priority:  Medium      Overview Signed 2023  8:05 AM by PEPE Vuong     Offer varivax PP         BMI 40.0-44.9, adult ([facility]) 2023 by PEPE Vuong No    Priority:  Medium      Overview Addendum 2024  7:26 AM by PEPE Vuong     Pre-preg BMI 43  A1C at new OB:   Fetal surveillance BMI >40 at NOB:  Growth US at 30 (43%) and 36 wks  BPP or NST weekly 34 wks to delivery    Timing of delivery: 39 0/7 - 39 6/7 wks  *BMI >= 50 at any time in pregnancy: Deliver at Level 4           Supervision of other high risk pregnancy, antepartum ([facility]) 2023 by PEPE Vuong No    Priority:  Medium      Overview Addendum 2024 12:25 PM by Gretel Scherer MD     Desired provider in labor: [] CNM  [x] Physician  [x] Dated by:  12w US  [x] Initial BMI: 43  [x] Prenatal Labs:  WNL except varicella nonimmue  [x] Rh status: B+  [x] Genetic Screening:  declined   [x] Baby ASA: prescribed 1/15    [x] Anatomy US:  [x] 1hr GCT at 24-28wks: drawn    [] 3 hr GTT (if indicated):  [] Fetal  Surveillance (if indicated): weekly NST starting at 34 wga     [] Tdap (27-36wks): declined   [x] Flu Shot: declined 1/15  [] COVID vaccine:     [] Breastfeeding:  [] Pain management during labor:   [x] Postpartum Birth control method: nexplanon     [x] GBS at 36 wks: collected   [x] 39 weeks discussion of IOL vs. Expectant management: scheduled IOL on   [x] Mode of delivery: TOLAC              History of postpartum hemorrhage 2023 by PEPE Vuong No    Priority:  Medium      Overview Signed 2023  3:55 PM by PEPE Vuong     : 1850ml EBL, TXA/cyto          Hx of  section 2023 by PEPE Vuong No    Priority:  Medium      Overview Addendum 2024 12:09 PM by Gretel Scherer MD     LTCS  due to NRFHT remote from birth     MFMU 48.6% NEEDS MD consult based on 268lb pre-preg weight. Desires TOLAC.     Will need to be TXC pRBC for induction                Hospital course: chronic hypertension   section delivery  Patient is currently breastfeeding  Patient is not breastfeedingThe patient's blood type is B POS. The baby's blood type is pending. Rhogam is not indicated.    Subjective   Her pain is well controlled with current medications  She is passing flatus  She is ambulating well  She is tolerating a Adult diet Regular  She reports no breast or nursing problems  She denies emotional concerns today   Her plan for contraception is IUD w/progesterone     Denies HA, SOB, RUQ pain, vision changes.   denies dizziness/lightheadedness/LOC/SOB/uncontrolled bleeding     Objective   Allergies:   Patient has no known allergies.         Last Vitals:  Temp Pulse Resp BP MAP Pulse Ox   36.9 °C (98.4 °F) 104 18 124/83   98 %     Vitals Min/Max Last 24 Hours:  Temp  Min: 36.2 °C (97.2 °F)  Max: 36.9 °C (98.4 °F)  Pulse  Min: 87  Max: 110  Resp  Min: 16  Max: 18  BP  Min: 118/75  Max: 144/85    Intake/Output:     Intake/Output  Summary (Last 24 hours) at 7/21/2024 1500  Last data filed at 7/21/2024 0240  Gross per 24 hour   Intake 127 ml   Output 690 ml   Net -563 ml       Physical Exam:  General: Examination reveals a well developed, well nourished, female, in no acute distress. She is alert and cooperative.  Lungs: symmetrical, non-labored breathing.  Cardiac: warm, well-perfused.  Abdomen: soft, non-tender.  Incision: Well approximated, without erythema/edema/drainage  Fundus: firm, below umbilicus, appropriately tender  Extremities: no redness or tenderness in the calves or thighs.  Neurological: alert, oriented, normal speech, no focal findings or movement disorder noted.     Lab Data:  Labs in chart were reviewed.

## 2024-07-21 NOTE — ANESTHESIA POSTPROCEDURE EVALUATION
Josselyn Mcarthur is a 28 y.o., , who had a , Low Transverse delivery on 2024 at 39w1d and is now POD1.    She had Neuraxial Anesthesia without immediate complications noted.       Pain well controlled    Vitals:    24 0403   BP: 118/75   Pulse: 93   Resp: 18   Temp: 36.2 °C (97.2 °F)   SpO2: 97%       Neuraxial site assessed. No visible redness or swelling or drainage. Patient able to ambulate and move all extremities without difficulty. Able to void. No complaints of nausea/vomiting. Tolerating PO intake well. No s/sx of PDPH.     Anesthesia will sign off     Dereck Barry,

## 2024-07-21 NOTE — LACTATION NOTE
Lactation Consultant Note  Lactation Consultation  Reason for Consult: Follow-up assessment  Consultant Name: Adriana Weir RN, IBCLC    Maternal Information       Maternal Assessment  Breast Assessment: Medium, Large, Soft, Warm, Compressible (expressible)  Nipple Assessment: Intact, Erect  Areola Assessment: Normal    Infant Assessment  Infant Behavior: Quiet alert, Feeding cues observed  Infant Assessment: Good cupping of tongue    Feeding Assessment  Nutrition Source: Breastmilk, Formula (per mother’s request)  Feeding Method: Nursing at the breast, Paced bottle  Feeding Position: Skin to skin, Football/seated, Mother needs assistance with latch/positioning  Suck/Feeding: Tactile stimulation needed, Swallowing intermittently only with encouragement  Latch Assessment: Flanged lips, Minimal audible swallowing, Moderate assistance is needed, Deep latch obtained, Areolar attachment, Comfortable with no pain    LATCH TOOL  Latch: Repeated attempts, hold nipple in mouth, stimulate to suck  Audible Swallowing: A few with stimulation  Type of Nipple: Everted (After stimulation)  Comfort (Breast/Nipple): Soft/non-tender  Hold (Positioning): Minimal assist, teach one side, mother does other, staff holds  LATCH Score: 7    Breast Pump  Pump: Hospital grade electric pump  Frequency: 8-10 times per day (encouraged)  Duration: Initiate phase  Breast Shield Size and Type: 24 mm  Units of Volume: Drops    Other OB Lactation Tools  Lactation Tools: Flanges    Patient Follow-up  Inpatient Lactation Follow-up Needed : Yes  Outpatient Lactation Follow-up: Recommended    Other OB Lactation Documentation  Maternal Risk Factors: Hypertension  Infant Risk Factors: High birth weight >3600 g    Recommendations/Summary  Upon entering the room, infant due to feed. Mother states she tried to latch infant earlier today but she wasn't sure if she was getting anything so fed a bottle of formula after. Discussed colostrum amount,  consistency and how it differs from what infant experiences with bottle/bottle nipple. I offered to assist with latching infant at this time, mother agreeable. Infant placed at right breast in football hold with pillow support. Instructed mother on correct hold of infant as well as c-hold and breast sandwich with breast. Infant able to latch deeply to left breast, had a few non-nutritive sucks then became sleepy. Tactile stimulation provided and this helped to wake infant a bit and have a few nutritive sucks and one or two swallows noted. Infant remained latched with minimal response to tactile stimulation for about 5 more minutes then infant unlatched and did not show more feeding cues. Mother attempted to burp infant then offered formula. I encouraged mother to then pump and moving forward continue the same cycle of trying to feed at breast, supplement as needed with expressed breast milk and/or formula, then pump. We discussed that as milk supply increases in breasts, latching might be more successful.  provided to mother on tips on breast feeding and milk storage.

## 2024-07-22 PROCEDURE — 1210000001 HC SEMI-PRIVATE ROOM DAILY

## 2024-07-22 PROCEDURE — 2500000001 HC RX 250 WO HCPCS SELF ADMINISTERED DRUGS (ALT 637 FOR MEDICARE OP)

## 2024-07-22 PROCEDURE — 2500000004 HC RX 250 GENERAL PHARMACY W/ HCPCS (ALT 636 FOR OP/ED)

## 2024-07-22 ASSESSMENT — PAIN SCALES - GENERAL
PAINLEVEL_OUTOF10: 6
PAINLEVEL_OUTOF10: 7
PAINLEVEL_OUTOF10: 0 - NO PAIN
PAINLEVEL_OUTOF10: 6
PAINLEVEL_OUTOF10: 3

## 2024-07-22 NOTE — SIGNIFICANT EVENT
Patient meets criteria for home monitoring of blood pressure post discharge.  Reason:  history of chronic hypertension. Met with patient to assess for availability of home BP monitor.  Patient stated she owns home BP monitor. . Patient educated on importance of continuing to monitor BP at home, recording BP on home monitoring log and s/sx of when to call her provider.  Pt verbalized understanding the above information.

## 2024-07-22 NOTE — PROGRESS NOTES
Postpartum Progress Note      Assessment/Plan       Josselyn Mcarthur is a 28 y.o., , who initially presented for TOLAC IOL in s/o [place]. She had a , Low Transverse delivery for NRFHT on 2024 at 39w1d and is now POD2.      Acute blood loss anemia   - Hg  10.3 -> EBL 1100 -> POD1 9.5  - asx  - for PO Fe at discharge  Results from last 7 days   Lab Units 24  0502 24  1218   HEMOGLOBIN g/dL 9.5* 10.3*      cHTN  - no meds  - asx  - largely normotensive in post partum period, 1 mild range >36hrs ago  - HELLP labs negative on admission  - discussed 3 day stay and pt states understanding   - BP cuff for home at discharge, to monitor BID    Post-op , Low Transverse, c/b moderate adhesive disease  - continue routine postoperative care  - pain well controlled on ERAS protocol  - Pt's blood type is B POS. Rhogam is not indicated.  - DVT Score: 5 SCDs and enoxaparin prophylactic dose daily while inpatient    Contraception  Post-placental IUD    Maternal Well-Being  - emotional support provided  - bonding with infant  -  feeding: breastfeeding/pumping encouraged; lactation consult prn     Dispo  - anticipate d/c on POD #3 if meeting all post-op and postpartum milestones  - The signs and symptoms of PEC were reviewed with the patient, including unrelenting headache, vision changes/blurred vision, and RUQ pain  - BP cuff for home for checking BP twice a day  - Pt instructed to call primary OB if SBP > 160 or DBP > 110 or if development of PEC symptoms   - On discharge, follow up with primary OB in:       - 2-5 days for BP check       - 2 weeks for incision check       - 4-6 weeks for post-partum visit       Principal Problem:     delivery delivered ([facility])  Active Problems:    Chronic hypertension    History of postpartum hemorrhage    Hx of  section    Anemia during pregnancy in third trimester ([facility])    Mild asthma ([facility])    PPH (postpartum hemorrhage)  ([facility])        Subjective   Her pain is well controlled with current medications  She is passing flatus  She is ambulating independently  She is tolerating a Adult diet Regular  She is voiding spontaneously  She reports no breast or nursing problems  She denies emotional concerns today     Reports she is feeling good, having some intermittent abdominal discomfort with position changes and ambulation. She is eliminating well, tolerating PO. Denies CP, SOB, HA, RUQ pain, scotoma, vision changes, LE edema.      Objective   Last Vitals:  Temp Pulse Resp BP MAP Pulse Ox   36.3 °C (97.3 °F) 87 20 124/78   96 %       Physical Exam:  General: well appearing, obese, postpartum  Obstetric: fundus firm below umbilicus, lochia light  Skin: Warm, dry; no rashes/lesions/erythema  Abdominal: incision covered by dressing with no sx of active bleeding   Breast: No masses, nipple discharge  Neuro: A/Ox3, no gross motor deficit   GI: no distension, appropriately tender, soft  Respiratory: Even and unlabored on RA  Cardiovascular: Trace BLE edema; No erythema, warmth  Psych: appropriate mood and affect      Lab Data:  Lab Results   Component Value Date    WBC 8.8 07/21/2024    HGB 9.5 (L) 07/21/2024    HCT 30.5 (L) 07/21/2024     07/21/2024

## 2024-07-23 VITALS
DIASTOLIC BLOOD PRESSURE: 87 MMHG | TEMPERATURE: 98.4 F | HEIGHT: 67 IN | BODY MASS INDEX: 45.78 KG/M2 | OXYGEN SATURATION: 95 % | HEART RATE: 97 BPM | WEIGHT: 291.67 LBS | RESPIRATION RATE: 20 BRPM | SYSTOLIC BLOOD PRESSURE: 136 MMHG

## 2024-07-23 PROCEDURE — 2500000001 HC RX 250 WO HCPCS SELF ADMINISTERED DRUGS (ALT 637 FOR MEDICARE OP)

## 2024-07-23 PROCEDURE — 2500000004 HC RX 250 GENERAL PHARMACY W/ HCPCS (ALT 636 FOR OP/ED)

## 2024-07-23 RX ORDER — ACETAMINOPHEN 325 MG/1
975 TABLET ORAL EVERY 6 HOURS
Qty: 360 TABLET | Refills: 0 | Status: SHIPPED | OUTPATIENT
Start: 2024-07-23 | End: 2024-08-22

## 2024-07-23 RX ORDER — IBUPROFEN 600 MG/1
600 TABLET ORAL EVERY 6 HOURS
Qty: 120 TABLET | Refills: 0 | Status: SHIPPED | OUTPATIENT
Start: 2024-07-23 | End: 2024-08-22

## 2024-07-23 RX ORDER — POLYETHYLENE GLYCOL 3350 17 G/17G
17 POWDER, FOR SOLUTION ORAL DAILY
Qty: 30 PACKET | Refills: 0 | Status: SHIPPED | OUTPATIENT
Start: 2024-07-23 | End: 2024-08-22

## 2024-07-23 RX ORDER — OXYCODONE HYDROCHLORIDE 5 MG/1
5 TABLET ORAL EVERY 4 HOURS PRN
Qty: 15 TABLET | Refills: 0 | Status: SHIPPED | OUTPATIENT
Start: 2024-07-23 | End: 2024-07-26

## 2024-07-23 RX ORDER — NALOXONE HYDROCHLORIDE 4 MG/.1ML
4 SPRAY NASAL AS NEEDED
Qty: 2 EACH | Refills: 1 | Status: SHIPPED | OUTPATIENT
Start: 2024-07-23

## 2024-07-23 ASSESSMENT — PAIN SCALES - GENERAL
PAINLEVEL_OUTOF10: 5 - MODERATE PAIN
PAINLEVEL_OUTOF10: 5 - MODERATE PAIN

## 2024-07-23 NOTE — DISCHARGE SUMMARY
Discharge Summary    Admission Date: 2024  Discharge Date: 24      Discharge Diagnosis   delivery delivered ([facility])    Hospital Course  Delivery Date: 2024 4:04 AM  Delivery type: , Low Transverse   GA at delivery: 39w1d  Outcome: Living  Anesthesia during delivery: Epidural  Intrapartum complications: Fetal Intolerance  Feeding method: Breastfeeding Status: Unknown     Procedures: none  Contraception at discharge: IUD  Pt. Feels ready to go home.  Denies any chtn s/sx.  Has bp cuff at home, on no meds.  Early follow up.    Pertinent Physical Exam At Time of Discharge  General: Examination reveals a well developed, well nourished, female, in no acute distress. She is alert and cooperative.  Lungs: symmetrical, non-labored breathing.  Cardiac: warm, well-perfused.  Abdomen: soft, non-tender.  Fundus: firm, at umbilicus, and nontender.  Extremities: no redness or tenderness in the calves or thighs.  Neurological: alert, oriented, normal speech, no focal findings or movement disorder noted.     Discharge Meds     Your medication list        START taking these medications        Instructions Last Dose Given Next Dose Due   acetaminophen 325 mg tablet  Commonly known as: Tylenol      Take 3 tablets (975 mg) by mouth every 6 hours.       ibuprofen 600 mg tablet      Take 1 tablet (600 mg) by mouth every 6 hours.       naloxone 4 mg/0.1 mL nasal spray  Commonly known as: Narcan      Administer 1 spray (4 mg) into affected nostril(s) if needed for opioid reversal or respiratory depression. May repeat every 2-3 minutes if needed, alternating nostrils, until medical assistance becomes available.       oxyCODONE 5 mg immediate release tablet  Commonly known as: Roxicodone      Take 1 tablet (5 mg) by mouth every 4 hours if needed (Pain score 6-8) for up to 3 days.       polyethylene glycol 17 gram packet  Commonly known as: Glycolax, Miralax      Take 17 g by mouth once daily.               STOP taking these medications      doxylamine 25 mg tablet  Commonly known as: Unisom (doxylamine)        ferrous gluconate 324 (38 Fe) mg tablet  Commonly known as: Fergohubert        WesTab Plus 27 mg iron- 1 mg tablet  Generic drug: prenatal vitamin calcium-iron-folic                  Where to Get Your Medications        These medications were sent to [id number] - [place], [place] - [address]  [address] [facility] [address]      Phone: [phone]   acetaminophen 325 mg tablet  ibuprofen 600 mg tablet  naloxone 4 mg/0.1 mL nasal spray  oxyCODONE 5 mg immediate release tablet  polyethylene glycol 17 gram packet          Complications Requiring Follow-Up  Heavy vaginal bleeding, passing clots, fever and/or chills      Test Results Pending At Discharge  Pending Labs       Order Current Status    Surgical Pathology Exam - [facility] In process            Outpatient Follow-Up  No future appointments.    I spent 10 minutes in the professional and overall care of this patient.      Precious Longo, APRN-CNP

## 2024-07-23 NOTE — CARE PLAN
Problem: Postpartum  Goal: Experiences normal postpartum course  Outcome: Met  Goal: Appropriate maternal -  bonding  Outcome: Met  Goal: Establish and maintain infant feeding pattern for adequate nutrition  Outcome: Met  Goal: Incisions, wounds, or drain sites healing without S/S of infection  Outcome: Met  Goal: No s/sx infection  Outcome: Met  Goal: No s/sx of hemorrhage  Outcome: Met  Goal: Minimal s/sx of HDP and BP<160/110  Outcome: Met     Problem: Pain - Adult  Goal: Verbalizes/displays adequate comfort level or baseline comfort level  Outcome: Met     Problem: Safety - Adult  Goal: Free from fall injury  Outcome: Met     Problem: Discharge Planning  Goal: Discharge to home or other facility with appropriate resources  Outcome: Met     Problem: Fall/Injury  Goal: Not fall by end of shift  Outcome: Met  Goal: Be free from injury by end of the shift  Outcome: Met  Goal: Verbalize understanding of personal risk factors for fall in the hospital  Outcome: Met  Goal: Verbalize understanding of risk factor reduction measures to prevent injury from fall in the home  Outcome: Met     Problem: Pain  Goal: Takes deep breaths with improved pain control throughout the shift  Outcome: Met  Goal: Turns in bed with improved pain control throughout the shift  Outcome: Met  Goal: Walks with improved pain control throughout the shift  Outcome: Met  Goal: Performs ADL's with improved pain control throughout shift  Outcome: Met  Goal: Participates in PT with improved pain control throughout the shift  Outcome: Met  Goal: Free from opioid side effects throughout the shift  Outcome: Met  Goal: Free from acute confusion related to pain meds throughout the shift  Outcome: Met   The patient's goals for the shift include D/C    The clinical goals for the shift include clear for d/c    Patient to maintain stable vitals T<38.0 R 12-20 breaths/min BP<160/90 until ADOD

## 2024-07-23 NOTE — CARE PLAN
Problem: Postpartum  Goal: Experiences normal postpartum course  Outcome: Progressing  Goal: Appropriate maternal -  bonding  Outcome: Progressing  Goal: Establish and maintain infant feeding pattern for adequate nutrition  Outcome: Progressing     Problem: Pain - Adult  Goal: Verbalizes/displays adequate comfort level or baseline comfort level  Outcome: Progressing     Problem: Safety - Adult  Goal: Free from fall injury  Outcome: Progressing     Problem: Fall/Injury  Goal: Not fall by end of shift  Outcome: Progressing  Goal: Be free from injury by end of the shift  Outcome: Progressing

## 2024-07-26 LAB
LABORATORY COMMENT REPORT: NORMAL
PATH REPORT.FINAL DX SPEC: NORMAL
PATH REPORT.GROSS SPEC: NORMAL
PATH REPORT.RELEVANT HX SPEC: NORMAL
PATH REPORT.TOTAL CANCER: NORMAL

## 2024-07-29 ENCOUNTER — CLINICAL SUPPORT (OUTPATIENT)
Dept: OBSTETRICS AND GYNECOLOGY | Facility: CLINIC | Age: 29
End: 2024-07-29
Payer: MEDICAID

## 2024-07-29 VITALS — DIASTOLIC BLOOD PRESSURE: 92 MMHG | WEIGHT: 268 LBS | BODY MASS INDEX: 41.97 KG/M2 | SYSTOLIC BLOOD PRESSURE: 149 MMHG

## 2024-07-29 DIAGNOSIS — I10 CHRONIC HYPERTENSION: Primary | ICD-10-CM

## 2024-07-29 RX ORDER — AMLODIPINE BESYLATE 5 MG/1
5 TABLET ORAL DAILY
Qty: 30 TABLET | Refills: 11 | Status: SHIPPED | OUTPATIENT
Start: 2024-07-29 | End: 2025-07-29

## 2024-07-29 NOTE — PROGRESS NOTES
Pt here for 1 week pp visit. Incision clean and dry,well approximated, dressing removed. BP initially 149/92 repet 147/89. Dr Mims in to see pt and started pt on amlodipine. Pt states she is bottle feeding and that is going well. Pt states she is getting enough rest and has help.  Pt denies any depression.

## 2024-07-29 NOTE — PROGRESS NOTES
Saw patient for 1 week postpartum BP check. BP mild range. Asymptomatic. Discussed initiation of medication. Previously on amlodipine 5 mg and did well with this.     Discussed triage precautions including persistent BP >150/100 and symptoms of pre-eclampsia. Will move postpartum visit to ~4 weeks.     Allison Mims MD

## 2024-08-20 ENCOUNTER — APPOINTMENT (OUTPATIENT)
Dept: OBSTETRICS AND GYNECOLOGY | Facility: CLINIC | Age: 29
End: 2024-08-20
Payer: MEDICAID

## 2024-08-30 ENCOUNTER — POSTPARTUM VISIT (OUTPATIENT)
Dept: OBSTETRICS AND GYNECOLOGY | Facility: CLINIC | Age: 29
End: 2024-08-30
Payer: MEDICAID

## 2024-08-30 VITALS
BODY MASS INDEX: 40.46 KG/M2 | SYSTOLIC BLOOD PRESSURE: 125 MMHG | HEIGHT: 67 IN | WEIGHT: 257.8 LBS | HEART RATE: 84 BPM | DIASTOLIC BLOOD PRESSURE: 79 MMHG

## 2024-08-30 DIAGNOSIS — Z30.012 ENCOUNTER FOR EMERGENCY CONTRACEPTION: ICD-10-CM

## 2024-08-30 DIAGNOSIS — Z87.59 HISTORY OF POSTPARTUM HEMORRHAGE: ICD-10-CM

## 2024-08-30 DIAGNOSIS — O13.9 GESTATIONAL HYPERTENSION, ANTEPARTUM (HHS-HCC): ICD-10-CM

## 2024-08-30 PROCEDURE — 99214 OFFICE O/P EST MOD 30 MIN: CPT | Performed by: ADVANCED PRACTICE MIDWIFE

## 2024-08-30 ASSESSMENT — EDINBURGH POSTNATAL DEPRESSION SCALE (EPDS)
I HAVE FELT SCARED OR PANICKY FOR NO GOOD REASON: NO, NOT AT ALL
I HAVE BLAMED MYSELF UNNECESSARILY WHEN THINGS WENT WRONG: NOT VERY OFTEN
TOTAL SCORE: 3
I HAVE LOOKED FORWARD WITH ENJOYMENT TO THINGS: AS MUCH AS I EVER DID
I HAVE FELT SAD OR MISERABLE: NO, NOT AT ALL
THE THOUGHT OF HARMING MYSELF HAS OCCURRED TO ME: NEVER
I HAVE BEEN SO UNHAPPY THAT I HAVE HAD DIFFICULTY SLEEPING: NOT VERY OFTEN
I HAVE BEEN ANXIOUS OR WORRIED FOR NO GOOD REASON: NO, NOT AT ALL
THINGS HAVE BEEN GETTING ON TOP OF ME: NO, MOST OF THE TIME I HAVE COPED QUITE WELL
I HAVE BEEN ABLE TO LAUGH AND SEE THE FUNNY SIDE OF THINGS: AS MUCH AS I ALWAYS COULD
I HAVE BEEN SO UNHAPPY THAT I HAVE BEEN CRYING: NO, NEVER

## 2024-08-30 ASSESSMENT — ENCOUNTER SYMPTOMS
CARDIOVASCULAR NEGATIVE: 0
CONSTITUTIONAL NEGATIVE: 0
ALLERGIC/IMMUNOLOGIC NEGATIVE: 0
GASTROINTESTINAL NEGATIVE: 0
NEUROLOGICAL NEGATIVE: 0
HEMATOLOGIC/LYMPHATIC NEGATIVE: 0
MUSCULOSKELETAL NEGATIVE: 0
ENDOCRINE NEGATIVE: 0
EYES NEGATIVE: 0
RESPIRATORY NEGATIVE: 0
PSYCHIATRIC NEGATIVE: 0

## 2024-08-30 ASSESSMENT — PAIN SCALES - GENERAL: PAINLEVEL: 0-NO PAIN

## 2024-08-30 ASSESSMENT — PATIENT HEALTH QUESTIONNAIRE - PHQ9
1. LITTLE INTEREST OR PLEASURE IN DOING THINGS: NOT AT ALL
SUM OF ALL RESPONSES TO PHQ9 QUESTIONS 1 AND 2: 0
2. FEELING DOWN, DEPRESSED OR HOPELESS: NOT AT ALL

## 2024-08-30 NOTE — PROGRESS NOTES
Plan    Advised to call office for breast complaints, abnormal bleeding, mood changes, or other concerning symptoms.   Cleared to resume normal activity as desired  Rx emergency contraception  Physical exam completed patient did not bring form will fill out at a later date    Evangelina YBARRA CNM      Problem List Items Addressed This Visit             ICD-10-CM       Ob-Gyn Problems    History of postpartum hemorrhage Z87.59     delivery delivered ([facility]) O82     Other Visit Diagnoses         Codes    Encounter for postpartum visit ([facility])    -  Primary Z39.2    Relevant Medications    ulipristal (Claudia) 30 mg tablet    Gestational hypertension, antepartum ([facility])     O13.9    Encounter for emergency contraception     Z30.012    Relevant Medications    ulipristal (Claudia) 30 mg tablet            PEPE Gonsales    Subjective   28 y.o.  presenting for postpartum follow-up     Delivery Date:   Gestational Age: <None>   Type of Delivery: , Low Transverse     Pregnancy Problems (from 23 to present)       Problem Noted Resolved    Maternal varicella, non-immune ([facility]) 2023 by PEPE Vuong No    Priority:  Medium      Overview Signed 2023  8:05 AM by PPEE Vuong     Offer varivax PP         BMI 40.0-44.9, adult ([facility]) 2023 by PEPE Vuong No    Priority:  Medium      Overview Addendum 2024  7:26 AM by PEPE Vuong     Pre-preg BMI 43  A1C at new OB:   Fetal surveillance BMI >40 at NOB:  Growth US at 30 (43%) and 36 wks  BPP or NST weekly 34 wks to delivery    Timing of delivery: 39 0/7 - 39 6/7 wks  *BMI >= 50 at any time in pregnancy: Deliver at Level 4           Supervision of other high risk pregnancy, antepartum ([facility]) 2023 by PEPE Vuong No    Priority:  Medium      Overview Addendum 2024 12:25 PM by Gretel Scherer MD      Desired provider in labor: [] CNM  [x] Physician  [x] Dated by:  12w US  [x] Initial BMI: 43  [x] Prenatal Labs:  WNL except varicella nonimmue  [x] Rh status: B+  [x] Genetic Screening:  declined   [x] Baby ASA: prescribed 1/15    [x] Anatomy US:  [x] 1hr GCT at 24-28wks: drawn    [] 3 hr GTT (if indicated):  [] Fetal Surveillance (if indicated): weekly NST starting at 34 wga     [] Tdap (27-36wks): declined   [x] Flu Shot: declined 1/15  [] COVID vaccine:     [] Breastfeeding:  [] Pain management during labor:   [x] Postpartum Birth control method: nexplanon     [x] GBS at 36 wks: collected   [x] 39 weeks discussion of IOL vs. Expectant management: scheduled IOL on   [x] Mode of delivery: TOLAC              History of postpartum hemorrhage 2023 by PEPE Vuong No    Priority:  Medium      Overview Signed 2023  3:55 PM by PEPE Vuong     : 1850ml EBL, TXA/cyto          Hx of  section 2023 by PEPE Vuong No    Priority:  Medium      Overview Addendum 2024 12:09 PM by Gretel Scherer MD     LTCS  due to NRFHT remote from birth     MFMU 48.6% NEEDS MD consult based on 268lb pre-preg weight. Desires TOLAC.     Will need to be TXC pRBC for induction           delivery delivered ([facility]) 2024 by Azucena Matute MD No    Large fetus causing disproportion, antepartum ([facility]) 2024 by PEPE Vuong No    Overview Signed 2024  4:24 PM by PEPE Vuong     @36w: Accelerated interval fetal growth with EFW at 96%ile and AC at 97%ile  BPP 8/8, normal amniotic fluid  Recommend weekly  testing         Anemia during pregnancy in third trimester ([facility]) 2024 by Aliza Harden MD No    Overview Signed 2024  6:59 PM by Aliza Harden MD     -last Hgb 10.7, normal Iron and TIBC, normal Hg ID  - currently increased iron rich foods in  "diet                  Concerns: mid afternoon tugging pain IUD coming out   Pain: controlled  Lacerations: n/a  Lochia: none  Sexual Intimacy: Yes  Contraceptive Method: IUD and spontaneous expulsion  Feeding Method: She is bottle feeding. no breast or nursing problems  Lactation Consult Needed?: No    Birth Trauma: No  Bonding with Baby: well with baby girl, @Carlos@ 10lb!  Mood:   Postpartum Depression: Low Risk  (2024)    [place]  Depression Scale     Last EPDS Total Score: 3     Last EPDS Self Harm Result: Never       Last pap:  WNL  Objective    /79   Pulse 84   Ht 1.702 m (5' 7\")   Wt 117 kg (257 lb 12.8 oz)   LMP 10/15/2023   Breastfeeding No   BMI 40.38 kg/m²    Physical Exam  Constitutional:       Appearance: Normal appearance. She is obese.   Genitourinary:      Genitourinary Comments: deferred   Cardiovascular:      Rate and Rhythm: Normal rate and regular rhythm.      Pulses: Normal pulses.      Heart sounds: Normal heart sounds.   Pulmonary:      Effort: Pulmonary effort is normal.      Breath sounds: Normal breath sounds.   Abdominal:      General: Abdomen is flat. Bowel sounds are normal.      Comments: INCISION  well healed no edema or erythema   Skin:     General: Skin is warm and dry.   Psychiatric:         Mood and Affect: Mood normal.         Behavior: Behavior normal.         Thought Content: Thought content normal.         Judgment: Judgment normal.        "

## 2024-08-30 NOTE — LETTER
August 30, 2024     Josselyn Mcarthur    Patient: Josseyln Mcarthur   YOB: 1995   Date of Visit: 8/30/2024       Dear Dr. Josselyn Mcarthur's Employer    Thank you for referring Josselyn Mcarthur to me for evaluation. Josselyn delivered her baby 7/20/2024. She is safe to return back to work for full duty on or after 10/7/2024.    Sincerely,     PEPE Gonsales      CC: No Recipients  ______________________________________________________________________________________

## 2024-09-03 ENCOUNTER — APPOINTMENT (OUTPATIENT)
Dept: OBSTETRICS AND GYNECOLOGY | Facility: CLINIC | Age: 29
End: 2024-09-03
Payer: MEDICAID

## 2024-12-05 ENCOUNTER — HOSPITAL ENCOUNTER (EMERGENCY)
Facility: HOSPITAL | Age: 29
Discharge: HOME | End: 2024-12-05
Attending: STUDENT IN AN ORGANIZED HEALTH CARE EDUCATION/TRAINING PROGRAM
Payer: MEDICAID

## 2024-12-05 ENCOUNTER — APPOINTMENT (OUTPATIENT)
Dept: RADIOLOGY | Facility: HOSPITAL | Age: 29
End: 2024-12-05
Payer: MEDICAID

## 2024-12-05 VITALS
WEIGHT: 250 LBS | HEIGHT: 66 IN | SYSTOLIC BLOOD PRESSURE: 142 MMHG | BODY MASS INDEX: 40.18 KG/M2 | TEMPERATURE: 96.8 F | DIASTOLIC BLOOD PRESSURE: 97 MMHG | OXYGEN SATURATION: 100 % | RESPIRATION RATE: 18 BRPM | HEART RATE: 93 BPM

## 2024-12-05 DIAGNOSIS — K80.20 CALCULUS OF GALLBLADDER WITHOUT CHOLECYSTITIS WITHOUT OBSTRUCTION: Primary | ICD-10-CM

## 2024-12-05 LAB
ALBUMIN SERPL BCP-MCNC: 4.6 G/DL (ref 3.4–5)
ALP SERPL-CCNC: 100 U/L (ref 33–110)
ALT SERPL W P-5'-P-CCNC: 114 U/L (ref 7–45)
ANION GAP SERPL CALC-SCNC: 11 MMOL/L (ref 10–20)
APPEARANCE UR: ABNORMAL
AST SERPL W P-5'-P-CCNC: 231 U/L (ref 9–39)
BACTERIA #/AREA URNS AUTO: ABNORMAL /HPF
BASOPHILS # BLD AUTO: 0.02 X10*3/UL (ref 0–0.1)
BASOPHILS NFR BLD AUTO: 0.2 %
BILIRUB DIRECT SERPL-MCNC: 0.1 MG/DL (ref 0–0.3)
BILIRUB SERPL-MCNC: 0.5 MG/DL (ref 0–1.2)
BILIRUB UR STRIP.AUTO-MCNC: NEGATIVE MG/DL
BUN SERPL-MCNC: 15 MG/DL (ref 6–23)
CALCIUM SERPL-MCNC: 9.8 MG/DL (ref 8.6–10.6)
CHLORIDE SERPL-SCNC: 105 MMOL/L (ref 98–107)
CO2 SERPL-SCNC: 26 MMOL/L (ref 21–32)
COLOR UR: YELLOW
CREAT SERPL-MCNC: 0.67 MG/DL (ref 0.5–1.05)
EGFRCR SERPLBLD CKD-EPI 2021: >90 ML/MIN/1.73M*2
EOSINOPHIL # BLD AUTO: 0.04 X10*3/UL (ref 0–0.7)
EOSINOPHIL NFR BLD AUTO: 0.5 %
ERYTHROCYTE [DISTWIDTH] IN BLOOD BY AUTOMATED COUNT: 14.4 % (ref 11.5–14.5)
GLUCOSE SERPL-MCNC: 95 MG/DL (ref 74–99)
GLUCOSE UR STRIP.AUTO-MCNC: NORMAL MG/DL
HCT VFR BLD AUTO: 38.1 % (ref 36–46)
HGB BLD-MCNC: 12.6 G/DL (ref 12–16)
HOLD SPECIMEN: NORMAL
IMM GRANULOCYTES # BLD AUTO: 0.03 X10*3/UL (ref 0–0.7)
IMM GRANULOCYTES NFR BLD AUTO: 0.4 % (ref 0–0.9)
KETONES UR STRIP.AUTO-MCNC: NEGATIVE MG/DL
LEUKOCYTE ESTERASE UR QL STRIP.AUTO: NEGATIVE
LIPASE SERPL-CCNC: 39 U/L (ref 9–82)
LYMPHOCYTES # BLD AUTO: 1.12 X10*3/UL (ref 1.2–4.8)
LYMPHOCYTES NFR BLD AUTO: 13.5 %
MCH RBC QN AUTO: 25.7 PG (ref 26–34)
MCHC RBC AUTO-ENTMCNC: 33.1 G/DL (ref 32–36)
MCV RBC AUTO: 78 FL (ref 80–100)
MONOCYTES # BLD AUTO: 0.62 X10*3/UL (ref 0.1–1)
MONOCYTES NFR BLD AUTO: 7.5 %
MUCOUS THREADS #/AREA URNS AUTO: ABNORMAL /LPF
NEUTROPHILS # BLD AUTO: 6.48 X10*3/UL (ref 1.2–7.7)
NEUTROPHILS NFR BLD AUTO: 77.9 %
NITRITE UR QL STRIP.AUTO: NEGATIVE
NRBC BLD-RTO: 0 /100 WBCS (ref 0–0)
PH UR STRIP.AUTO: 7 [PH]
PLATELET # BLD AUTO: 249 X10*3/UL (ref 150–450)
POTASSIUM SERPL-SCNC: 4.7 MMOL/L (ref 3.5–5.3)
PREGNANCY TEST URINE, POC: NEGATIVE
PROT SERPL-MCNC: 8.8 G/DL (ref 6.4–8.2)
PROT UR STRIP.AUTO-MCNC: ABNORMAL MG/DL
RBC # BLD AUTO: 4.91 X10*6/UL (ref 4–5.2)
RBC # UR STRIP.AUTO: NEGATIVE /UL
RBC #/AREA URNS AUTO: ABNORMAL /HPF
SODIUM SERPL-SCNC: 137 MMOL/L (ref 136–145)
SP GR UR STRIP.AUTO: 1.03
SQUAMOUS #/AREA URNS AUTO: ABNORMAL /HPF
UROBILINOGEN UR STRIP.AUTO-MCNC: ABNORMAL MG/DL
WBC # BLD AUTO: 8.3 X10*3/UL (ref 4.4–11.3)
WBC #/AREA URNS AUTO: ABNORMAL /HPF

## 2024-12-05 PROCEDURE — 87086 URINE CULTURE/COLONY COUNT: CPT | Performed by: PHYSICIAN ASSISTANT

## 2024-12-05 PROCEDURE — 80048 BASIC METABOLIC PNL TOTAL CA: CPT | Performed by: PHYSICIAN ASSISTANT

## 2024-12-05 PROCEDURE — 76705 ECHO EXAM OF ABDOMEN: CPT

## 2024-12-05 PROCEDURE — 36415 COLL VENOUS BLD VENIPUNCTURE: CPT | Performed by: PHYSICIAN ASSISTANT

## 2024-12-05 PROCEDURE — 81025 URINE PREGNANCY TEST: CPT | Performed by: PHYSICIAN ASSISTANT

## 2024-12-05 PROCEDURE — 81001 URINALYSIS AUTO W/SCOPE: CPT | Performed by: PHYSICIAN ASSISTANT

## 2024-12-05 PROCEDURE — 82248 BILIRUBIN DIRECT: CPT | Performed by: PHYSICIAN ASSISTANT

## 2024-12-05 PROCEDURE — 76705 ECHO EXAM OF ABDOMEN: CPT | Performed by: STUDENT IN AN ORGANIZED HEALTH CARE EDUCATION/TRAINING PROGRAM

## 2024-12-05 PROCEDURE — 83690 ASSAY OF LIPASE: CPT | Performed by: PHYSICIAN ASSISTANT

## 2024-12-05 PROCEDURE — 85025 COMPLETE CBC W/AUTO DIFF WBC: CPT | Performed by: PHYSICIAN ASSISTANT

## 2024-12-05 PROCEDURE — 99285 EMERGENCY DEPT VISIT HI MDM: CPT | Mod: 25 | Performed by: STUDENT IN AN ORGANIZED HEALTH CARE EDUCATION/TRAINING PROGRAM

## 2024-12-05 RX ORDER — ONDANSETRON 4 MG/1
4 TABLET, ORALLY DISINTEGRATING ORAL EVERY 6 HOURS PRN
Qty: 28 TABLET | Refills: 0 | Status: SHIPPED | OUTPATIENT
Start: 2024-12-05 | End: 2024-12-12

## 2024-12-05 RX ORDER — ONDANSETRON HYDROCHLORIDE 2 MG/ML
4 INJECTION, SOLUTION INTRAVENOUS ONCE AS NEEDED
Status: DISCONTINUED | OUTPATIENT
Start: 2024-12-05 | End: 2024-12-05 | Stop reason: HOSPADM

## 2024-12-05 RX ORDER — FAMOTIDINE 20 MG/1
20 TABLET, FILM COATED ORAL 2 TIMES DAILY PRN
Qty: 30 TABLET | Refills: 0 | Status: SHIPPED | OUTPATIENT
Start: 2024-12-05 | End: 2024-12-20

## 2024-12-05 ASSESSMENT — LIFESTYLE VARIABLES
EVER FELT BAD OR GUILTY ABOUT YOUR DRINKING: NO
TOTAL SCORE: 0
EVER HAD A DRINK FIRST THING IN THE MORNING TO STEADY YOUR NERVES TO GET RID OF A HANGOVER: NO
HAVE PEOPLE ANNOYED YOU BY CRITICIZING YOUR DRINKING: NO
HAVE YOU EVER FELT YOU SHOULD CUT DOWN ON YOUR DRINKING: NO

## 2024-12-05 ASSESSMENT — COLUMBIA-SUICIDE SEVERITY RATING SCALE - C-SSRS
6. HAVE YOU EVER DONE ANYTHING, STARTED TO DO ANYTHING, OR PREPARED TO DO ANYTHING TO END YOUR LIFE?: NO
2. HAVE YOU ACTUALLY HAD ANY THOUGHTS OF KILLING YOURSELF?: NO
1. IN THE PAST MONTH, HAVE YOU WISHED YOU WERE DEAD OR WISHED YOU COULD GO TO SLEEP AND NOT WAKE UP?: NO

## 2024-12-05 ASSESSMENT — PAIN SCALES - GENERAL: PAINLEVEL_OUTOF10: 6

## 2024-12-05 ASSESSMENT — PAIN DESCRIPTION - DESCRIPTORS: DESCRIPTORS: CRAMPING

## 2024-12-05 ASSESSMENT — PAIN DESCRIPTION - LOCATION: LOCATION: ABDOMEN

## 2024-12-05 ASSESSMENT — PAIN - FUNCTIONAL ASSESSMENT: PAIN_FUNCTIONAL_ASSESSMENT: 0-10

## 2024-12-05 ASSESSMENT — PAIN DESCRIPTION - PAIN TYPE: TYPE: ACUTE PAIN

## 2024-12-05 NOTE — ED PROVIDER NOTES
Emergency Department Encounter  [facility] EMERGENCY MEDICINE    Patient: Josselyn Mcarthur  MRN: [id number]  : 1995  Date of Evaluation: 2024  ED Provider: Elisa Sanchez PA-C      Chief Complaint       Chief Complaint   Patient presents with    Abdominal Cramping     HPI    Josselyn Mcarthur is a 28 y.o. female who presents to the emergency department presenting for intermittent right upper quadrant pain today.  Patient states that she has had this previously.  Was diagnosed with gallstones in October.  Was referred to follow-up with surgery, however her pain and nausea resolved.  States that she was at work this morning when she had sudden severe right upper quadrant pain with nausea and diaphoresis.  Reports that this did spontaneously resolved prior to my evaluation this morning.  Is concerned that something has changed with her gallstones.  Denies known current pregnancy.  G2, P2, LMP approximately 11/7.  No associated dysuria, hematuria, changes in urinary frequency or urgency, abnormal vaginal bleeding or discharge.  Did not have any vomiting or diarrhea.  Reports that her nausea has also resolved.    ROS:     Review of Systems  14 systems reviewed and otherwise acutely negative except as in the HPI.    Past History     Past Medical History:   Diagnosis Date    Asthma ([facility])     well controlled, denies hospitalization/intubation    Chronic hypertension      Past Surgical History:   Procedure Laterality Date     SECTION, LOW TRANSVERSE  2022    NRFHT remote from delivery     Social History     Socioeconomic History    Marital status: Single   Tobacco Use    Smoking status: Never    Smokeless tobacco: Never   Vaping Use    Vaping status: Never Used   Substance and Sexual Activity    Alcohol use: Not Currently    Drug use: Not Currently     Types: Marijuana    Sexual activity: Not Currently     Partners: Male     Social Drivers of Health     Financial Resource  Strain: Low Risk  (7/19/2024)    Overall Financial Resource Strain (CARDIA)     Difficulty of Paying Living Expenses: Not hard at all   Transportation Needs: No Transportation Needs (7/19/2024)    PRAPARE - Transportation     Lack of Transportation (Medical): No     Lack of Transportation (Non-Medical): No       Medications/Allergies     Previous Medications    AMLODIPINE (NORVASC) 5 MG TABLET    Take 1 tablet (5 mg) by mouth once daily.    NALOXONE (NARCAN) 4 MG/0.1 ML NASAL SPRAY    Administer 1 spray (4 mg) into affected nostril(s) if needed for opioid reversal or respiratory depression. May repeat every 2-3 minutes if needed, alternating nostrils, until medical assistance becomes available.     No Known Allergies     Physical Exam       ED Triage Vitals [12/05/24 0558]   Temperature Heart Rate Respirations BP   36 °C (96.8 °F) 93 18 (!) 142/97      Pulse Ox Temp Source Heart Rate Source Patient Position   100 % Temporal Monitor Sitting      BP Location FiO2 (%)     Left arm --         Physical Exam    Physical Exam:    VS: As documented in the triage note and EMR flowsheet from this visit were reviewed.    Appearance: Alert, oriented, cooperative, in no acute distress. Well nourished & well hydrated.    Skin: Warm, intact and dry.     Neck: Supple, without meningismus. No lymphadenopathy.     Pulmonary: Clear bilaterally with good chest wall excursion. No rales, rhonchi or wheezing. No accessory muscle use or stridor.    Cardiac: Normal S1, S2 without murmur, rub, gallop or extrasystole. No reproducible chest tenderness.    Abdomen: Soft, nontender, active bowel sounds. Negative Joyner's sign. No point tenderness at McBurney's point, negative Rovsing and Psoas sign. No rebound or guarding.     Musculoskeletal: Spontaneously moving all extremities without limitation. Extremities warm and well-perfused.    Neurological:  Cranial nerves II through XII are grossly intact.    Psychiatric: Appropriate mood and affect.  Kempt appearance.      Diagnostics   Labs:  Labs Reviewed   CBC WITH AUTO DIFFERENTIAL - Abnormal       Result Value    WBC 8.3      nRBC 0.0      RBC 4.91      Hemoglobin 12.6      Hematocrit 38.1      MCV 78 (*)     MCH 25.7 (*)     MCHC 33.1      RDW 14.4      Platelets 249      Neutrophils % 77.9      Immature Granulocytes %, Automated 0.4      Lymphocytes % 13.5      Monocytes % 7.5      Eosinophils % 0.5      Basophils % 0.2      Neutrophils Absolute 6.48      Immature Granulocytes Absolute, Automated 0.03      Lymphocytes Absolute 1.12 (*)     Monocytes Absolute 0.62      Eosinophils Absolute 0.04      Basophils Absolute 0.02     HEPATIC FUNCTION PANEL - Abnormal    Albumin 4.6      Bilirubin, Total 0.5      Bilirubin, Direct 0.1      Alkaline Phosphatase 100       (*)      (*)     Total Protein 8.8 (*)    URINALYSIS WITH REFLEX CULTURE AND MICROSCOPIC - Abnormal    Color, Urine Yellow      Appearance, Urine Turbid (*)     Specific Gravity, Urine 1.030      pH, Urine 7.0      Protein, Urine 20 (TRACE)      Glucose, Urine Normal      Blood, Urine NEGATIVE      Ketones, Urine NEGATIVE      Bilirubin, Urine NEGATIVE      Urobilinogen, Urine 3 (1+) (*)     Nitrite, Urine NEGATIVE      Leukocyte Esterase, Urine NEGATIVE     URINALYSIS MICROSCOPIC WITH REFLEX CULTURE - Abnormal    WBC, Urine 11-20 (*)     RBC, Urine 6-10 (*)     Squamous Epithelial Cells, Urine 1-9 (SPARSE)      Bacteria, Urine 1+ (*)     Mucus, Urine FEW     BASIC METABOLIC PANEL - Normal    Glucose 95      Sodium 137      Potassium 4.7      Chloride 105      Bicarbonate 26      Anion Gap 11      Urea Nitrogen 15      Creatinine 0.67      eGFR >90      Calcium 9.8     LIPASE - Normal    Lipase 39      Narrative:     Venipuncture immediately after or during the administration of Metamizole may lead to falsely low results. Testing should be performed immediately prior to Metamizole dosing.   POCT PREGNANCY, URINE - Normal    Preg Test,  "Ur Negative     URINE CULTURE   URINALYSIS WITH REFLEX CULTURE AND MICROSCOPIC    Narrative:     The following orders were created for panel order Urinalysis with Reflex Culture and Microscopic.  Procedure                               Abnormality         Status                     ---------                               -----------         ------                     Urinalysis with Reflex C...[[id number]]  Abnormal            Final result               Extra Urine Gray Tube[[id number]]                            In process                   Please view results for these tests on the individual orders.   EXTRA URINE GRAY TUBE       Radiographs:  US right upper quadrant   Final Result   *Cholelithiasis with borderline gallbladder wall thickening likely   secondary to incomplete distention. No sonographic evidence of acute   cholecystitis. *Mild diffuse increase in echogenicity of the hepatic   parenchyma, suggestive of diffuse fatty infiltration.             I personally reviewed the images/study and I agree with the findings   as stated by Wayne Bearden MD (Radiology Resident).        MACRO:   None             Signed by: Sarina Bueno 12/5/2024 11:40 AM   Dictation workstation:   [id number]          ED Course   Visit Vitals  BP (!) 142/97 (BP Location: Left arm, Patient Position: Sitting)   Pulse 93   Temp 36 °C (96.8 °F) (Temporal)   Resp 18   Ht 1.676 m (5' 6\")   Wt 113 kg (250 lb)   SpO2 100%   BMI 40.35 kg/m²   OB Status Recent pregnancy   Smoking Status Never   BSA 2.29 m²     Medications   ondansetron (Zofran) injection 4 mg (has no administration in time range)       Medical Decision Making   CBC without leukocytosis or acute anemia.  BMP unremarkable.  LFT significant for elevated ALT and AST with normal alk phos.  Nonelevated bili.  Patient not having any lower abdominal pain and or urinary complaints, therefore we will reflex UA to culture and defer antibiotic therapy at this " time.  Ultrasound concerning for cholelithiasis with borderline gallbladder wall thickening.  Patient has not had any associated pain, nausea or vomiting since arriving to the emergency department.  Given her overall well appearance and lack of acute cholecystitis on ultrasound, she will be discharged home with prescriptions for Pepcid and Zofran as needed.  Registration engaged at discharge to schedule outpatient general surgery appointment for follow-up.  Encouraged to return to the emergency department for any severe pain, persistent nausea and vomiting, fevers. Staffed with supervising physician, Josie Dow MD, who agrees with workup and treatment plan.      Final Impression      1. Calculus of gallbladder without cholecystitis without obstruction          DISPOSITION  Disposition: discharge  Patient condition is: Stable    Comment: Please note this report has been produced using speech recognition software and may contain errors related to that system including errors in grammar, punctuation, and spelling, as well as words and phrases that may be inappropriate.  If there are any questions or concerns please feel free to contact the dictating provider for clarification.    KYLIE Shi PA-C  12/05/24 1144

## 2024-12-05 NOTE — DISCHARGE INSTRUCTIONS
Take zofran as needed for pain, pepcid as needed for abdominal pain.  Referred to general surgery for followup.  Encouraged to return to ED for severe pain, nausea/vomiting.    Please call [phone] for Primary Care referral for follow up appointments.

## 2024-12-05 NOTE — ED TRIAGE NOTES
Pt arrived to ED reporting 6/10 cramping abdominal pain that started approx. 2 hours PTA, pt denies having any V/D

## 2024-12-05 NOTE — Clinical Note
Josselyn Mcarthur was seen and treated in our emergency department on 12/5/2024.  She may return to work on 12/07/2024.       If you have any questions or concerns, please don't hesitate to call.      Josie Dow MD

## 2024-12-06 LAB — BACTERIA UR CULT: NORMAL

## 2024-12-06 NOTE — PROGRESS NOTES
Josselyn Mcarthur  [id number]   24  8:52 AM    HPI/Subjective:  Josselyn Mcarthur is a 28 y.o. female who is referred to clinic by Elisa Sanchez PA* for evaluation of symptomatic gallstones.    Symptomatically, this patient experiences right upper quadrant pain and nausea and/or vomiting after meals , which is gradually worsening - first noticed in October.  Doubled over in pain at that time. She was diagnosed with gallstones at that time and was referred to follow-up with surgery, however symptoms resolved.  She again experienced symptoms on  which included nausea, right upper quadrant and epigastric pain, and subjective warmth. Doing ok now when she eats, has not changed diet.    They have had a few presentation(s) to the hospital for these symptoms.    Workup has included labs notable for 8.3 WBC, 0.1 bilirubin, 100 alk phos, and 39 lipase, and Ultrasound abdomen with doppler: cholelithiasis with borderline gallbladder wall thickening     Past surgical history is otherwise notable for  section (22).     Past medical history is significant for:  asthma, HTN, postpartum hemorrhage    Hepatic insufficiency or liver failure - No  Ascites - No  Hypertension - Yes  Diabetes mellitus - No   Dialysis (acute or chronic renal failure requiring dialysis within 2 weeks prior to surgery) - None  COPD - No  Dyspnea - No  Antiplatelet medications excluding aspirin - No  Anticoagulation medications - No  Aspirin - No  Immunosuppression - No  Nicotine use in relation to OR date - No  History of AAA - No  Collagen vascular disorder - No  Congestive heart failure - No  Active pregnancy - No    Review of Systems     Current Outpatient Medications   Medication Instructions   • amLODIPine (NORVASC) 5 mg, oral, Daily   • famotidine (PEPCID) 20 mg, oral, 2 times daily PRN   • naloxone (NARCAN) 4 mg, nasal, As needed, May repeat every 2-3 minutes if needed, alternating nostrils, until medical assistance  becomes available.   • ondansetron ODT (ZOFRAN-ODT) 4 mg, oral, Every 6 hours PRN       No Known Allergies    Objective:  Vitals:    12/10/24 1040   BP: 120/78   Pulse: 80   Resp: 16     Body mass index is 42.45 kg/m².  Physical Exam  Vitals reviewed. Exam conducted with a chaperone present.   Constitutional:       General: She is not in acute distress.     Appearance: Normal appearance. She is not ill-appearing.   HENT:      Head: Normocephalic.      Mouth/Throat:      Mouth: Mucous membranes are moist.   Eyes:      Extraocular Movements: Extraocular movements intact.      Pupils: Pupils are equal, round, and reactive to light.   Cardiovascular:      Rate and Rhythm: Normal rate and regular rhythm.      Pulses: Normal pulses.      Heart sounds: Normal heart sounds. No murmur heard.  Pulmonary:      Effort: Pulmonary effort is normal. No respiratory distress.      Breath sounds: Normal breath sounds. No wheezing, rhonchi or rales.   Chest:      Chest wall: No tenderness.   Abdominal:      General: There is no distension.      Palpations: There is no mass.      Tenderness: There is no abdominal tenderness. There is no guarding or rebound.      Hernia: No hernia is present.   Musculoskeletal:         General: No swelling or deformity.      Cervical back: Neck supple. No rigidity.      Right lower leg: No edema.      Left lower leg: No edema.   Skin:     General: Skin is warm.      Coloration: Skin is not jaundiced or pale.   Neurological:      General: No focal deficit present.      Mental Status: She is alert and oriented to person, place, and time. Mental status is at baseline.   Psychiatric:         Mood and Affect: Mood normal.         Behavior: Behavior normal.         Thought Content: Thought content normal.         Judgment: Judgment normal.         Imaging consisting of RUQ Ultrasound was reviewed personally and was notable for gallstones and no evidence of acute gilles .    Assessment/Plan:  Josselyn Mcarthur  is a 28 y.o. female with history of gallstones who presents with symptoms and workup consistent with  biliary colic .    We will plan to book the patient for surgery and refer the patient for preoperative testing. Plan will be for laparoscopic, possible open cholecystectomy with cholangiogram. Discussed with patient the risks (general risks including bleeding, infection, injury to other intra-abdominal organs, and general anesthesia among others, as well as procedure specific risks including bile leak, retained stones, or biliary injury), benefits, and alternatives and surgical consent was obtained in the office.    I will see the patient back in the office in the postoperative period or sooner if needed.    Portions of medical record reviewed for pertinent issues including active problem list, medication list, allergies, social history, health maintenance, notes from previous encounters, lab results, and imaging.     Juan Carolina MD  Assistant Professor of Surgery  Division of General Surgery  Department of Surgery

## 2024-12-10 ENCOUNTER — APPOINTMENT (OUTPATIENT)
Dept: SURGERY | Facility: CLINIC | Age: 29
End: 2024-12-10
Payer: MEDICAID

## 2024-12-10 VITALS
DIASTOLIC BLOOD PRESSURE: 78 MMHG | SYSTOLIC BLOOD PRESSURE: 120 MMHG | HEART RATE: 80 BPM | HEIGHT: 66 IN | BODY MASS INDEX: 42.27 KG/M2 | WEIGHT: 263 LBS | RESPIRATION RATE: 16 BRPM

## 2024-12-10 DIAGNOSIS — K80.20 CALCULUS OF GALLBLADDER WITHOUT CHOLECYSTITIS WITHOUT OBSTRUCTION: Primary | ICD-10-CM

## 2024-12-10 PROCEDURE — 3074F SYST BP LT 130 MM HG: CPT | Performed by: SURGERY

## 2024-12-10 PROCEDURE — 3078F DIAST BP <80 MM HG: CPT | Performed by: SURGERY

## 2024-12-10 PROCEDURE — 99204 OFFICE O/P NEW MOD 45 MIN: CPT | Performed by: SURGERY

## 2024-12-10 PROCEDURE — 3008F BODY MASS INDEX DOCD: CPT | Performed by: SURGERY

## 2024-12-10 RX ORDER — SCOLOPAMINE TRANSDERMAL SYSTEM 1 MG/1
1 PATCH, EXTENDED RELEASE TRANSDERMAL
OUTPATIENT
Start: 2024-12-10 | End: 2024-12-13

## 2024-12-10 RX ORDER — CEFAZOLIN SODIUM 2 G/100ML
2 INJECTION, SOLUTION INTRAVENOUS ONCE
OUTPATIENT
Start: 2024-12-10 | End: 2024-12-10

## 2024-12-10 RX ORDER — ACETAMINOPHEN 325 MG/1
975 TABLET ORAL ONCE
OUTPATIENT
Start: 2024-12-10 | End: 2024-12-10

## 2024-12-10 RX ORDER — HEPARIN SODIUM 5000 [USP'U]/ML
5000 INJECTION, SOLUTION INTRAVENOUS; SUBCUTANEOUS ONCE
OUTPATIENT
Start: 2024-12-10 | End: 2024-12-10

## 2024-12-10 RX ORDER — GABAPENTIN 300 MG/1
300 CAPSULE ORAL ONCE
OUTPATIENT
Start: 2024-12-10 | End: 2024-12-10

## 2024-12-10 ASSESSMENT — PAIN SCALES - GENERAL: PAINLEVEL_OUTOF10: 0-NO PAIN

## 2025-01-13 ENCOUNTER — PRE-ADMISSION TESTING (OUTPATIENT)
Dept: PREADMISSION TESTING | Facility: HOSPITAL | Age: 30
End: 2025-01-13
Payer: MEDICAID

## 2025-01-13 DIAGNOSIS — Z01.818 PREOP TESTING: Primary | ICD-10-CM

## 2025-01-14 ENCOUNTER — PRE-ADMISSION TESTING (OUTPATIENT)
Dept: PREADMISSION TESTING | Facility: HOSPITAL | Age: 30
End: 2025-01-14
Payer: MEDICAID

## 2025-01-14 VITALS
OXYGEN SATURATION: 98 % | WEIGHT: 260.14 LBS | TEMPERATURE: 96.8 F | BODY MASS INDEX: 41.81 KG/M2 | HEART RATE: 93 BPM | DIASTOLIC BLOOD PRESSURE: 86 MMHG | RESPIRATION RATE: 20 BRPM | SYSTOLIC BLOOD PRESSURE: 147 MMHG | HEIGHT: 66 IN

## 2025-01-14 DIAGNOSIS — K80.20 CALCULUS OF GALLBLADDER WITHOUT CHOLECYSTITIS WITHOUT OBSTRUCTION: ICD-10-CM

## 2025-01-14 DIAGNOSIS — Z01.818 PREOP TESTING: ICD-10-CM

## 2025-01-14 DIAGNOSIS — Z01.818 PRE-OP TESTING: Primary | ICD-10-CM

## 2025-01-14 LAB
ABO GROUP (TYPE) IN BLOOD: NORMAL
ABO GROUP (TYPE) IN BLOOD: NORMAL
ANTIBODY SCREEN: NORMAL
RH FACTOR (ANTIGEN D): NORMAL
RH FACTOR (ANTIGEN D): NORMAL

## 2025-01-14 PROCEDURE — 93005 ELECTROCARDIOGRAM TRACING: CPT

## 2025-01-14 PROCEDURE — 36415 COLL VENOUS BLD VENIPUNCTURE: CPT

## 2025-01-14 PROCEDURE — 99204 OFFICE O/P NEW MOD 45 MIN: CPT

## 2025-01-14 PROCEDURE — 86901 BLOOD TYPING SEROLOGIC RH(D): CPT

## 2025-01-14 ASSESSMENT — DUKE ACTIVITY SCORE INDEX (DASI)
CAN YOU WALK A BLOCK OR TWO ON LEVEL GROUND: YES
CAN YOU CLIMB A FLIGHT OF STAIRS OR WALK UP A HILL: YES
TOTAL_SCORE: 40.2
CAN YOU DO LIGHT WORK AROUND THE HOUSE LIKE DUSTING OR WASHING DISHES: YES
CAN YOU RUN A SHORT DISTANCE: YES
CAN YOU DO HEAVY WORK AROUND THE HOUSE LIKE SCRUBBING FLOORS OR LIFTING AND MOVING HEAVY FURNITURE: YES
DASI METS SCORE: 7.7
CAN YOU PARTICIPATE IN STRENOUS SPORTS LIKE SWIMMING, SINGLES TENNIS, FOOTBALL, BASKETBALL, OR SKIING: NO
CAN YOU DO MODERATE WORK AROUND THE HOUSE LIKE VACUUMING, SWEEPING FLOORS OR CARRYING GROCERIES: YES
CAN YOU PARTICIPATE IN MODERATE RECREATIONAL ACTIVITIES LIKE GOLF, BOWLING, DANCING, DOUBLES TENNIS OR THROWING A BASEBALL OR FOOTBALL: NO
CAN YOU WALK INDOORS, SUCH AS AROUND YOUR HOUSE: YES
CAN YOU TAKE CARE OF YOURSELF (EAT, DRESS, BATHE, OR USE TOILET): YES
CAN YOU HAVE SEXUAL RELATIONS: YES
CAN YOU DO YARD WORK LIKE RAKING LEAVES, WEEDING OR PUSHING A MOWER: NO

## 2025-01-14 ASSESSMENT — PAIN SCALES - GENERAL: PAINLEVEL_OUTOF10: 0 - NO PAIN

## 2025-01-14 ASSESSMENT — PAIN - FUNCTIONAL ASSESSMENT: PAIN_FUNCTIONAL_ASSESSMENT: 0-10

## 2025-01-14 NOTE — CPM/PAT H&P
CPM/PAT Evaluation       Name: Josselyn Mcarthur (Josselyn Mcarthur)  /Age: 1995/29 y.o.     Visit Type:   In-Person       Chief Complaint: Known gallstones requiring intervention    HPI  Patient is 29 year old female with a history of HTN, mild asthma, GERD, and gallstones who presents today for preoperative evaluation. Patient follows with Dr. Carolina for worsening symptoms related to calculus of the gallbladder without cholecystitis. She is scheduled for laparoscopic cholecystectomy with operative cholangiograms on 25 with Dr. Carolina. Patient denies recent illness, fever, chills, fatigue, cough, shortness of breath, chest pain, lower extremity edema, or urinary.   She does note that she had an episode of nausea and vomited once on 1/10 evening. She states she had a normal breakfast and felt fine all day until about 7pm when she vomited. She denies any abdominal pain or diarrhea.     Past Medical History:   Diagnosis Date    Asthma     well controlled, denies hospitalization/intubation    Cholelithiasis     Chronic hypertension     Gestational hypertension ([facility])     Obesity        Past Surgical History:   Procedure Laterality Date     SECTION, LOW TRANSVERSE  2022    NRFHT remote from delivery       Patient  reports that she is not currently sexually active and has had partner(s) who are male.    Family History   Problem Relation Name Age of Onset    Hypertension Mother      Depression Mother         No Known Allergies    Prior to Admission medications    Medication Sig Start Date End Date Taking? Authorizing Provider   amLODIPine (Norvasc) 5 mg tablet Take 1 tablet (5 mg) by mouth once daily. 24  Allison Mims MD   famotidine (Pepcid) 20 mg tablet Take 1 tablet (20 mg) by mouth 2 times a day as needed (abdominal pain) for up to 15 days. 24  Elisa Sanchez PA-C   naloxone (Narcan) 4 mg/0.1 mL nasal spray Administer 1 spray (4 mg) into affected  "nostril(s) if needed for opioid reversal or respiratory depression. May repeat every 2-3 minutes if needed, alternating nostrils, until medical assistance becomes available. 7/23/24   CHRIS Liu        A ten-point review of systems was completed and is otherwise negative except for what is mentioned in the HPI above.    Physical Exam:    GENERAL: Well developed, obese, awake/alert/oriented x3, no distress, alert and cooperative  HEENT: MMM, tongue piercing  NECK: Trachea midline, no lymphadenopathy  CARDIOVASCULAR: RRR, normal S1 and S 2, no murmurs, 2+ equal pulses of the extremities  RESPIRATORY: Patent airways, CTAB, normal breath sounds with good chest expansion, thorax symmetric  ABDOMEN: Soft, non-tender, no distention  SKIN: Warm and dry  EXTREMITIES: No cyanosis, edema  NEURO: A&O x 3, normal motor and sensation, no focal deficits   PSYCH: Appropriate mood and behavior      PAT AIRWAY:   Airway:     Mallampati::  II    TM distance::  >3 FB    Neck ROM::  Full  normal        Visit Vitals  /86   Pulse 93   Temp 36 °C (96.8 °F) (Temporal)   Resp 20   Ht 1.676 m (5' 6\")   Wt 118 kg (260 lb 2.3 oz)   LMP 12/15/2024   SpO2 98%   BMI 41.99 kg/m²   OB Status Recent pregnancy   Smoking Status Never   BSA 2.34 m²       DASI Risk Score      Flowsheet Row Pre-Admission Testing from 1/14/2025 in [facility]   Can you take care of yourself (eat, dress, bathe, or use toilet)?  2.75 filed at 01/14/2025 0911   Can you walk indoors, such as around your house? 1.75 filed at 01/14/2025 0911   Can you walk a block or two on level ground?  2.75 filed at 01/14/2025 0911   Can you climb a flight of stairs or walk up a hill? 5.5 filed at 01/14/2025 0911   Can you run a short distance? 8 filed at 01/14/2025 0911   Can you do light work around the house like dusting or washing dishes? 2.7 filed at 01/14/2025 0911   Can you do moderate work around the house like vacuuming, sweeping floors or carrying " groceries? 3.5 filed at 01/14/2025 0911   Can you do heavy work around the house like scrubbing floors or lifting and moving heavy furniture?  8 filed at 01/14/2025 0911   Can you do yard work like raking leaves, weeding or pushing a mower? 0 filed at 01/14/2025 0911   Can you have sexual relations? 5.25 filed at 01/14/2025 0911   Can you participate in moderate recreational activities like golf, bowling, dancing, doubles tennis or throwing a baseball or football? 0 filed at 01/14/2025 0911   Can you participate in strenous sports like swimming, singles tennis, football, basketball, or skiing? 0 filed at 01/14/2025 0911   DASI SCORE 40.2 filed at 01/14/2025 0911   METS Score (Will be calculated only when all the questions are answered) 7.7 filed at 01/14/2025 0911          Caprini DVT Assessment      Flowsheet Row Admission (Discharged) from 7/19/2024 in [facility] 3 Postpartum with Raya Strickland MD   DVT Score (IF A SCORE IS NOT CALCULATING, MUST SELECT A BMI TO COMPLETE) 5 filed at 07/20/2024 0522   Women Pregnancy or less than 1 month postpartum filed at 07/20/2024 0522   BMI (BMI MUST BE CHOSEN) 31-40 (Obesity) filed at 07/20/2024 0522   RETIRED: Age Less than 40 years filed at 07/20/2024 0522          Modified Frailty Index    No data to display       CHADS2 Stroke Risk  Current as of about an hour ago        N/A 3 to 100%: High Risk   2 to < 3%: Medium Risk   0 to < 2%: Low Risk     Last Change: N/A          This score determines the patient's risk of having a stroke if the patient has atrial fibrillation.        This score is not applicable to this patient. Components are not calculated.          Revised Cardiac Risk Index    No data to display       Apfel Simplified Score    No data to display       Risk Analysis Index Results This Encounter    No data found in the last 10 encounters.       Stop Bang Score      Flowsheet Row Pre-Admission Testing from [facility]/14/20[facility] in [facility]   Do you snore loudly? 1 filed at 01/14/2025 0911   Do you often feel tired or fatigued after your sleep? 0 filed at 01/14/2025 0911   Has anyone ever observed you stop breathing in your sleep? 0 filed at 01/14/2025 0911   Do you have or are you being treated for high blood pressure? 0 filed at 01/14/2025 0911   Recent BMI (Calculated) 42 filed at 01/14/2025 0911   Is BMI greater than 35 kg/m2? 1=Yes filed at 01/14/2025 0911   Age older than 50 years old? 0=No filed at 01/14/2025 0911   Is your neck circumference greater than 17 inches (Male) or 16 inches (Female)? 1 filed at 01/14/2025 0911   Gender - Male 0=No filed at 01/14/2025 0911   STOP-BANG Total Score 3 filed at 01/14/2025 0911          Prodigy: High Risk  Total Score: 0          ARISCAT Score for Postoperative Pulmonary Complications    No data to display       Carty Perioperative Risk for Myocardial Infarction or Cardiac Arrest (JEFFERY)    No data to display         Assessment and Plan:   Patient is 29 year old female with a history of HTN, mild asthma, GERD, and symptomatic gallstones.    #Calculus of the gallbladder without cholecystitis  She is scheduled for laparoscopic cholecystectomy with operative cholangiograms on 1/20/25 with Dr. Carolina.  Episodes of nausea and vomiting this past Friday that I do not believe is associated with gallstones. Symptoms were not post prandial and patient had no abdominal pain at that time. No further episodes of n/v    #HTN, Hx  Related to pregnancy. Formerly on Amlodipine, now not taking any medications for this. BP today 147/86    #Mild asthma, Hx  Never required inhalers or medications    #GERD, Hx  Was diet controled. Recently prescribed famotidine - encouraged to begin taking prior to surgery.    #Marijuana use  Smokes marijuana ~5x/week - advised to stop smoking at least 3 days prior to surgery    Labs from 12/5/2024 (BMP, CBC, hepatic function panel, lipase) reviewed and remarkable for elevated liver  function test with a normal bilirubin.  Type and screen obtained today and pending  EKG obtained today demonstrates normal sinus rhythm with sinus arrhythmia    I spent 45 minutes in the professional and overall care of this patient. Greater than 50% of this time was spent counseling patient, reviewing plan of care and discussing medication perioperative management.    Tara Steven, APRN-CNP

## 2025-01-14 NOTE — PREPROCEDURE INSTRUCTIONS
Medication List            Accurate as of January 14, 2025  9:27 AM. Always use your most recent med list.                famotidine 20 mg tablet  Commonly known as: Pepcid  Take 1 tablet (20 mg) by mouth 2 times a day as needed (abdominal pain) for up to 15 days.  Medication Adjustments for Surgery: Take on the morning of surgery                              NPO Instructions:    Do not eat any food after midnight the night before your surgery/procedure.    Additional Instructions:     Day of Surgery:  Wear  comfortable loose fitting clothing  Do not use moisturizers, creams, lotions or perfume  All jewelry and valuables should be left at home    PRE-OPERATIVE INSTRUCTIONS FOR SURGERY    *Do not eat anything after midnight the night of surgery.  This includes food of any kind (including hard candy, cough drops, mints).   You may have up to 13 ounces of clear liquid  until TWO hours prior to your scheduled surgery time, unless ERAS* protocol is ordered for you.  Clear liquids include water, black tea/coffee, (no milk or cream) apple juice and electrolyte drinks (GATORADE).  You may chew gum until TWO hours prior you your surgery/procedure.     *ERAS protocol: follow as instructed.  DO not drink an additional 13 ounces as noted above.        *One of our staff members will call you ONE business day before your surgery, between 11 am-2 pm to let you know the time to arrive.  If you have not received a call by 2 pm, call [phone]  *When you arrive at the hospital-->GO TO Registration on the ground floor  *Stop smoking 24 hours prior to surgery.  No Marijuana, CBD Oil or Vaping for 48 hours  *No alcohol 24 hours prior to surgery  *You will need a responsible adult to drive you home  -No acrylic nails or nail polish on at least one fingernail, NO polish on toes for foot surgery  -You may be asked to remove your dentures, partial plate, eyeglasses or contact lenses before going to surgery.  Please bring a case for  these items.  -Body piercings need to be removed.  Jewelry and valuables should be left at home.  -Put on loose,  comfortable, clean clothing, that will accommodate bandages        What is a home antibacterial shower?  This shower is a way of cleaning the skin with a germ killing solution before surgery.  The solution contains chlorhexidine, commonly known as CHG.  CHG is a skin cleanser with germ killing ability.  Let your doctor know if you are allergic to chlorhexidine.    Why do I need to take a preoperative antibacterial shower?  Skin is not sterile.  It is best to try to make your skin as free of germs as possible before surgery.  Proper cleansing with a germ killing soap before surgery can lower the number of germs on your skin.  This helps to reduce the risk of infection at the surgical site.  Following the instructions listed below will help you prepare your skin for surgery.      How do I use the solution?    Steps: Begin using your CHG soap 5 days before your surgery on __________________.    *First, wash and rinse your hair using the CHG soap.  Keep CHG soap away from ear canals and eyes.   Rinse completely, do not condition.  Hair extensions should be removed.    *Wash your face with your normal soap and rinse.   *Apply the CHG solution to a clean wet washcloth.  Turn the water off or move away from the water spray to avoid premature rinsing of the CHG soap as you are applying.  Firmly lather your entire body from the neck down.  Do not use on your face.    *Pay special attention to the area(s) where your incision(s) will be located unless they are on your face.  Avoid scrubbing your skin too hard.  The important part is to have the CHG soap sit on your skin for 3 minutes.   *When the 3 minutes are up, turn on the water and rinse the CHG solution off your body completely.  *Do not wash with regular soap after you  have  used the CHG soap solution.  *Pat  yourself dry with a clean, freshly laundered  towel.  *Do not apply powders, deodorants or lotions.  *Dress in clean freshly laundered night clothes.    *Be sure to sleep with clean freshly laundered sheets.    *Be aware the CHG will cause stains on fabrics; if you wash them with bleach after use.  Rinse your washcloth and other linens that have contact with CHG completely.  Use only non-chlorine detergents to launder the items  used.  *The morning of surgery is the fifth day.  Repeat the above steps and dress in clean comfortable clothing.     What is oral/dental rinse?  It is mouthwash.  It is a way of cleaning the he mouth with a germ-killing solution before your surgery.  The solution contains chlorhexidine, commonly known as CHG.  It is used inside the mouth to kill a bacteria known as Staphylococcus aureus.  Let your doctor know if you are allergic to Chlorhexidine.    Why do I need to use CHG oral/ dental rinse?  The CHG oral/dental rinse helps to kill bacteria in your mouth know as Staphylococcus aureus.  This reduces the risk of infection at the surgical site.    Using your CHG oral/dental rinse    STEPS:    Use your CHG oral/dental rinse after you brush your teeth the night before (at bedtime) and the morning of your surgery.  Follow all the directions on your prescription label.  *Use the cap on the container to measure 15 ml  *Swish (gargle if you can) the mouthwash in your mouth for at least 30 seconds, (do not swallow) and spit out  *After you use your CHG rinse, do not rinse your mouth with water, drink or eat.  Please refer to the prescription label for the appropriate time to resume oral intake.    What side effects might I have using the CHG oral/dental rinse?  CHG rinse will stick you plaque on the teeth.  Brush and floss just before use.   Teeth brushing will help avoid staining of the plaque during  use.  Teeth brushing will help avoid staining of plaque during  use.    Who should I contact if I have questions about the CHG oral/dental  rinse and or CHG soap?  Please call [facility], Pre-Admission testing at [phone] if you have any questions.    What you may be asked to bring to surgery:  ___Crutches, walker  ___CPAP machine  ___Urine specimen

## 2025-01-14 NOTE — H&P (VIEW-ONLY)
CPM/PAT Evaluation       Name: Josselyn Mcarthur (Josselyn Mcarthur)  /Age: 1995/29 y.o.     Visit Type:   In-Person       Chief Complaint: Known gallstones requiring intervention    HPI  Patient is 29 year old female with a history of HTN, mild asthma, GERD, and gallstones who presents today for preoperative evaluation. Patient follows with Dr. Carolina for worsening symptoms related to calculus of the gallbladder without cholecystitis. She is scheduled for laparoscopic cholecystectomy with operative cholangiograms on 25 with Dr. Carolina. Patient denies recent illness, fever, chills, fatigue, cough, shortness of breath, chest pain, lower extremity edema, or urinary.   She does note that she had an episode of nausea and vomited once on 1/10 evening. She states she had a normal breakfast and felt fine all day until about 7pm when she vomited. She denies any abdominal pain or diarrhea.     Past Medical History:   Diagnosis Date    Asthma     well controlled, denies hospitalization/intubation    Cholelithiasis     Chronic hypertension     Gestational hypertension ([facility]-HCC)     Obesity        Past Surgical History:   Procedure Laterality Date     SECTION, LOW TRANSVERSE  2022    NRFHT remote from delivery       Patient  reports that she is not currently sexually active and has had partner(s) who are male.    Family History   Problem Relation Name Age of Onset    Hypertension Mother      Depression Mother         No Known Allergies    Prior to Admission medications    Medication Sig Start Date End Date Taking? Authorizing Provider   amLODIPine (Norvasc) 5 mg tablet Take 1 tablet (5 mg) by mouth once daily. 24  Allison Mims MD   famotidine (Pepcid) 20 mg tablet Take 1 tablet (20 mg) by mouth 2 times a day as needed (abdominal pain) for up to 15 days. 24  Elisa Sanchez PA-C   naloxone (Narcan) 4 mg/0.1 mL nasal spray Administer 1 spray (4 mg) into affected  "nostril(s) if needed for opioid reversal or respiratory depression. May repeat every 2-3 minutes if needed, alternating nostrils, until medical assistance becomes available. 7/23/24   CHRIS Liu        A ten-point review of systems was completed and is otherwise negative except for what is mentioned in the HPI above.    Physical Exam:    GENERAL: Well developed, obese, awake/alert/oriented x3, no distress, alert and cooperative  HEENT: MMM, tongue piercing  NECK: Trachea midline, no lymphadenopathy  CARDIOVASCULAR: RRR, normal S1 and S 2, no murmurs, 2+ equal pulses of the extremities  RESPIRATORY: Patent airways, CTAB, normal breath sounds with good chest expansion, thorax symmetric  ABDOMEN: Soft, non-tender, no distention  SKIN: Warm and dry  EXTREMITIES: No cyanosis, edema  NEURO: A&O x 3, normal motor and sensation, no focal deficits   PSYCH: Appropriate mood and behavior      PAT AIRWAY:   Airway:     Mallampati::  II    TM distance::  >3 FB    Neck ROM::  Full  normal        Visit Vitals  /86   Pulse 93   Temp 36 °C (96.8 °F) (Temporal)   Resp 20   Ht 1.676 m (5' 6\")   Wt 118 kg (260 lb 2.3 oz)   LMP 12/15/2024   SpO2 98%   BMI 41.99 kg/m²   OB Status Recent pregnancy   Smoking Status Never   BSA 2.34 m²       DASI Risk Score      Flowsheet Row Pre-Admission Testing from 1/14/2025 in [facility]   Can you take care of yourself (eat, dress, bathe, or use toilet)?  2.75 filed at 01/14/2025 0911   Can you walk indoors, such as around your house? 1.75 filed at 01/14/2025 0911   Can you walk a block or two on level ground?  2.75 filed at 01/14/2025 0911   Can you climb a flight of stairs or walk up a hill? 5.5 filed at 01/14/2025 0911   Can you run a short distance? 8 filed at 01/14/2025 0911   Can you do light work around the house like dusting or washing dishes? 2.7 filed at 01/14/2025 0911   Can you do moderate work around the house like vacuuming, sweeping floors or carrying " groceries? 3.5 filed at 01/14/2025 0911   Can you do heavy work around the house like scrubbing floors or lifting and moving heavy furniture?  8 filed at 01/14/2025 0911   Can you do yard work like raking leaves, weeding or pushing a mower? 0 filed at 01/14/2025 0911   Can you have sexual relations? 5.25 filed at 01/14/2025 0911   Can you participate in moderate recreational activities like golf, bowling, dancing, doubles tennis or throwing a baseball or football? 0 filed at 01/14/2025 0911   Can you participate in strenous sports like swimming, singles tennis, football, basketball, or skiing? 0 filed at 01/14/2025 0911   DASI SCORE 40.2 filed at 01/14/2025 0911   METS Score (Will be calculated only when all the questions are answered) 7.7 filed at 01/14/2025 0911          Caprini DVT Assessment      Flowsheet Row Admission (Discharged) from 7/19/2024 in [facility] 3 Postpartum with Raya Strickland MD   DVT Score (IF A SCORE IS NOT CALCULATING, MUST SELECT A BMI TO COMPLETE) 5 filed at 07/20/2024 0522   Women Pregnancy or less than 1 month postpartum filed at 07/20/2024 0522   BMI (BMI MUST BE CHOSEN) 31-40 (Obesity) filed at 07/20/2024 0522   RETIRED: Age Less than 40 years filed at 07/20/2024 0522          Modified Frailty Index    No data to display       CHADS2 Stroke Risk  Current as of about an hour ago        N/A 3 to 100%: High Risk   2 to < 3%: Medium Risk   0 to < 2%: Low Risk     Last Change: N/A          This score determines the patient's risk of having a stroke if the patient has atrial fibrillation.        This score is not applicable to this patient. Components are not calculated.          Revised Cardiac Risk Index    No data to display       Apfel Simplified Score    No data to display       Risk Analysis Index Results This Encounter    No data found in the last 10 encounters.       Stop Bang Score      Flowsheet Row Pre-Admission Testing from 1/14/2025 in [facility]   Do you snore loudly? 1 filed at 01/14/2025 0911   Do you often feel tired or fatigued after your sleep? 0 filed at 01/14/2025 0911   Has anyone ever observed you stop breathing in your sleep? 0 filed at 01/14/2025 0911   Do you have or are you being treated for high blood pressure? 0 filed at 01/14/2025 0911   Recent BMI (Calculated) 42 filed at 01/14/2025 0911   Is BMI greater than 35 kg/m2? 1=Yes filed at 01/14/2025 0911   Age older than 50 years old? 0=No filed at 01/14/2025 0911   Is your neck circumference greater than 17 inches (Male) or 16 inches (Female)? 1 filed at 01/14/2025 0911   Gender - Male 0=No filed at 01/14/2025 0911   STOP-BANG Total Score 3 filed at 01/14/2025 0911          Prodigy: High Risk  Total Score: 0          ARISCAT Score for Postoperative Pulmonary Complications    No data to display       Carty Perioperative Risk for Myocardial Infarction or Cardiac Arrest (JEFFERY)    No data to display         Assessment and Plan:   Patient is 29 year old female with a history of HTN, mild asthma, GERD, and symptomatic gallstones.    #Calculus of the gallbladder without cholecystitis  She is scheduled for laparoscopic cholecystectomy with operative cholangiograms on 1/20/25 with Dr. Carolina.  Episodes of nausea and vomiting this past Friday that I do not believe is associated with gallstones. Symptoms were not post prandial and patient had no abdominal pain at that time. No further episodes of n/v    #HTN, Hx  Related to pregnancy. Formerly on Amlodipine, now not taking any medications for this. BP today 147/86    #Mild asthma, Hx  Never required inhalers or medications    #GERD, Hx  Was diet controled. Recently prescribed famotidine - encouraged to begin taking prior to surgery.    #Marijuana use  Smokes marijuana ~5x/week - advised to stop smoking at least 3 days prior to surgery    Labs from 12/5/2024 (BMP, CBC, hepatic function panel, lipase) reviewed and remarkable for elevated liver  function test with a normal bilirubin.  Type and screen obtained today and pending  EKG obtained today demonstrates normal sinus rhythm with sinus arrhythmia    I spent 45 minutes in the professional and overall care of this patient. Greater than 50% of this time was spent counseling patient, reviewing plan of care and discussing medication perioperative management.    Tara Steven, APRN-CNP

## 2025-01-15 LAB
ATRIAL RATE: 87 BPM
P AXIS: 88 DEGREES
P OFFSET: 182 MS
P ONSET: 158 MS
PR INTERVAL: 120 MS
Q ONSET: 218 MS
QRS COUNT: 14 BEATS
QRS DURATION: 80 MS
QT INTERVAL: 372 MS
QTC CALCULATION(BAZETT): 447 MS
QTC FREDERICIA: 420 MS
R AXIS: 41 DEGREES
T AXIS: 19 DEGREES
T OFFSET: 404 MS
VENTRICULAR RATE: 87 BPM

## 2025-01-20 ENCOUNTER — ANESTHESIA (OUTPATIENT)
Dept: OPERATING ROOM | Facility: HOSPITAL | Age: 30
End: 2025-01-20
Payer: MEDICAID

## 2025-01-20 ENCOUNTER — HOSPITAL ENCOUNTER (OUTPATIENT)
Facility: HOSPITAL | Age: 30
Discharge: HOME | End: 2025-01-21
Attending: SURGERY | Admitting: SURGERY
Payer: MEDICAID

## 2025-01-20 ENCOUNTER — ANESTHESIA EVENT (OUTPATIENT)
Dept: OPERATING ROOM | Facility: HOSPITAL | Age: 30
End: 2025-01-20
Payer: MEDICAID

## 2025-01-20 ENCOUNTER — APPOINTMENT (OUTPATIENT)
Dept: RADIOLOGY | Facility: HOSPITAL | Age: 30
End: 2025-01-20
Payer: MEDICAID

## 2025-01-20 DIAGNOSIS — K80.50 CHOLEDOCHOLITHIASIS: Primary | ICD-10-CM

## 2025-01-20 DIAGNOSIS — K80.20 CALCULUS OF GALLBLADDER WITHOUT CHOLECYSTITIS WITHOUT OBSTRUCTION: Primary | ICD-10-CM

## 2025-01-20 DIAGNOSIS — K80.50 CHOLEDOCHOLITHIASIS: ICD-10-CM

## 2025-01-20 PROBLEM — Z90.49 S/P LAPAROSCOPIC CHOLECYSTECTOMY: Status: ACTIVE | Noted: 2025-01-20

## 2025-01-20 LAB — PREGNANCY TEST URINE, POC: NEGATIVE

## 2025-01-20 PROCEDURE — 96372 THER/PROPH/DIAG INJ SC/IM: CPT | Performed by: SURGERY

## 2025-01-20 PROCEDURE — 3700000002 HC GENERAL ANESTHESIA TIME - EACH INCREMENTAL 1 MINUTE: Performed by: SURGERY

## 2025-01-20 PROCEDURE — 88304 TISSUE EXAM BY PATHOLOGIST: CPT | Mod: TC,PARLAB,WESLAB | Performed by: SURGERY

## 2025-01-20 PROCEDURE — 2780000003 HC OR 278 NO HCPCS: Performed by: SURGERY

## 2025-01-20 PROCEDURE — 2500000004 HC RX 250 GENERAL PHARMACY W/ HCPCS (ALT 636 FOR OP/ED)

## 2025-01-20 PROCEDURE — 76000 FLUOROSCOPY <1 HR PHYS/QHP: CPT | Mod: 59

## 2025-01-20 PROCEDURE — 2500000001 HC RX 250 WO HCPCS SELF ADMINISTERED DRUGS (ALT 637 FOR MEDICARE OP): Performed by: SURGERY

## 2025-01-20 PROCEDURE — 96372 THER/PROPH/DIAG INJ SC/IM: CPT

## 2025-01-20 PROCEDURE — 2500000004 HC RX 250 GENERAL PHARMACY W/ HCPCS (ALT 636 FOR OP/ED): Performed by: ANESTHESIOLOGY

## 2025-01-20 PROCEDURE — 7100000002 HC RECOVERY ROOM TIME - EACH INCREMENTAL 1 MINUTE: Performed by: SURGERY

## 2025-01-20 PROCEDURE — 2500000004 HC RX 250 GENERAL PHARMACY W/ HCPCS (ALT 636 FOR OP/ED): Performed by: STUDENT IN AN ORGANIZED HEALTH CARE EDUCATION/TRAINING PROGRAM

## 2025-01-20 PROCEDURE — 2720000007 HC OR 272 NO HCPCS: Performed by: SURGERY

## 2025-01-20 PROCEDURE — A47563 PR LAP,CHOLECYSTECTOMY/GRAPH: Performed by: ANESTHESIOLOGY

## 2025-01-20 PROCEDURE — 3600000003 HC OR TIME - INITIAL BASE CHARGE - PROCEDURE LEVEL THREE: Performed by: SURGERY

## 2025-01-20 PROCEDURE — 47563 LAPARO CHOLECYSTECTOMY/GRAPH: CPT | Performed by: SURGERY

## 2025-01-20 PROCEDURE — 7100000011 HC EXTENDED STAY RECOVERY HOURLY - NURSING UNIT

## 2025-01-20 PROCEDURE — 3700000001 HC GENERAL ANESTHESIA TIME - INITIAL BASE CHARGE: Performed by: SURGERY

## 2025-01-20 PROCEDURE — 7100000001 HC RECOVERY ROOM TIME - INITIAL BASE CHARGE: Performed by: SURGERY

## 2025-01-20 PROCEDURE — C1729 CATH, DRAINAGE: HCPCS | Performed by: SURGERY

## 2025-01-20 PROCEDURE — 96372 THER/PROPH/DIAG INJ SC/IM: CPT | Performed by: STUDENT IN AN ORGANIZED HEALTH CARE EDUCATION/TRAINING PROGRAM

## 2025-01-20 PROCEDURE — 3600000008 HC OR TIME - EACH INCREMENTAL 1 MINUTE - PROCEDURE LEVEL THREE: Performed by: SURGERY

## 2025-01-20 PROCEDURE — 2500000004 HC RX 250 GENERAL PHARMACY W/ HCPCS (ALT 636 FOR OP/ED): Performed by: SURGERY

## 2025-01-20 PROCEDURE — A47563 PR LAP,CHOLECYSTECTOMY/GRAPH

## 2025-01-20 PROCEDURE — 81025 URINE PREGNANCY TEST: CPT | Performed by: SURGERY

## 2025-01-20 PROCEDURE — 2500000001 HC RX 250 WO HCPCS SELF ADMINISTERED DRUGS (ALT 637 FOR MEDICARE OP): Performed by: STUDENT IN AN ORGANIZED HEALTH CARE EDUCATION/TRAINING PROGRAM

## 2025-01-20 PROCEDURE — 36415 COLL VENOUS BLD VENIPUNCTURE: CPT | Performed by: SURGERY

## 2025-01-20 RX ORDER — PROPOFOL 10 MG/ML
INJECTION, EMULSION INTRAVENOUS AS NEEDED
Status: DISCONTINUED | OUTPATIENT
Start: 2025-01-20 | End: 2025-01-20

## 2025-01-20 RX ORDER — SODIUM CHLORIDE, SODIUM LACTATE, POTASSIUM CHLORIDE, CALCIUM CHLORIDE 600; 310; 30; 20 MG/100ML; MG/100ML; MG/100ML; MG/100ML
100 INJECTION, SOLUTION INTRAVENOUS CONTINUOUS
Status: DISCONTINUED | OUTPATIENT
Start: 2025-01-20 | End: 2025-01-20 | Stop reason: HOSPADM

## 2025-01-20 RX ORDER — HYDRALAZINE HYDROCHLORIDE 20 MG/ML
5 INJECTION INTRAMUSCULAR; INTRAVENOUS EVERY 30 MIN PRN
Status: DISCONTINUED | OUTPATIENT
Start: 2025-01-20 | End: 2025-01-20 | Stop reason: HOSPADM

## 2025-01-20 RX ORDER — MIDAZOLAM HYDROCHLORIDE 1 MG/ML
1 INJECTION, SOLUTION INTRAMUSCULAR; INTRAVENOUS ONCE AS NEEDED
Status: DISCONTINUED | OUTPATIENT
Start: 2025-01-20 | End: 2025-01-20 | Stop reason: HOSPADM

## 2025-01-20 RX ORDER — HYDROMORPHONE HYDROCHLORIDE 1 MG/ML
INJECTION, SOLUTION INTRAMUSCULAR; INTRAVENOUS; SUBCUTANEOUS AS NEEDED
Status: DISCONTINUED | OUTPATIENT
Start: 2025-01-20 | End: 2025-01-20

## 2025-01-20 RX ORDER — ACETAMINOPHEN 160 MG/5ML
650 SOLUTION ORAL EVERY 6 HOURS
Status: DISCONTINUED | OUTPATIENT
Start: 2025-01-20 | End: 2025-01-21 | Stop reason: HOSPADM

## 2025-01-20 RX ORDER — MEPERIDINE HYDROCHLORIDE 50 MG/ML
12.5 INJECTION INTRAMUSCULAR; INTRAVENOUS; SUBCUTANEOUS EVERY 10 MIN PRN
Status: DISCONTINUED | OUTPATIENT
Start: 2025-01-20 | End: 2025-01-20 | Stop reason: HOSPADM

## 2025-01-20 RX ORDER — LABETALOL HYDROCHLORIDE 5 MG/ML
5 INJECTION, SOLUTION INTRAVENOUS ONCE AS NEEDED
Status: DISCONTINUED | OUTPATIENT
Start: 2025-01-20 | End: 2025-01-20 | Stop reason: HOSPADM

## 2025-01-20 RX ORDER — PROCHLORPERAZINE 25 MG/1
25 SUPPOSITORY RECTAL EVERY 12 HOURS PRN
Status: DISCONTINUED | OUTPATIENT
Start: 2025-01-20 | End: 2025-01-21 | Stop reason: HOSPADM

## 2025-01-20 RX ORDER — SODIUM CHLORIDE, SODIUM LACTATE, POTASSIUM CHLORIDE, CALCIUM CHLORIDE 600; 310; 30; 20 MG/100ML; MG/100ML; MG/100ML; MG/100ML
75 INJECTION, SOLUTION INTRAVENOUS CONTINUOUS
Status: DISCONTINUED | OUTPATIENT
Start: 2025-01-20 | End: 2025-01-21 | Stop reason: HOSPADM

## 2025-01-20 RX ORDER — PROCHLORPERAZINE EDISYLATE 5 MG/ML
10 INJECTION INTRAMUSCULAR; INTRAVENOUS EVERY 6 HOURS PRN
Status: DISCONTINUED | OUTPATIENT
Start: 2025-01-20 | End: 2025-01-21 | Stop reason: HOSPADM

## 2025-01-20 RX ORDER — ROCURONIUM BROMIDE 10 MG/ML
INJECTION, SOLUTION INTRAVENOUS AS NEEDED
Status: DISCONTINUED | OUTPATIENT
Start: 2025-01-20 | End: 2025-01-20

## 2025-01-20 RX ORDER — BUPIVACAINE HYDROCHLORIDE 5 MG/ML
INJECTION, SOLUTION EPIDURAL; INTRACAUDAL AS NEEDED
Status: DISCONTINUED | OUTPATIENT
Start: 2025-01-20 | End: 2025-01-20 | Stop reason: HOSPADM

## 2025-01-20 RX ORDER — SCOPOLAMINE 1 MG/3D
1 PATCH, EXTENDED RELEASE TRANSDERMAL
Status: DISCONTINUED | OUTPATIENT
Start: 2025-01-20 | End: 2025-01-20

## 2025-01-20 RX ORDER — ENOXAPARIN SODIUM 100 MG/ML
40 INJECTION SUBCUTANEOUS EVERY 12 HOURS SCHEDULED
Status: DISCONTINUED | OUTPATIENT
Start: 2025-01-20 | End: 2025-01-21 | Stop reason: HOSPADM

## 2025-01-20 RX ORDER — IBUPROFEN 600 MG/1
600 TABLET ORAL EVERY 6 HOURS
Status: DISCONTINUED | OUTPATIENT
Start: 2025-01-20 | End: 2025-01-21 | Stop reason: HOSPADM

## 2025-01-20 RX ORDER — ACETAMINOPHEN 325 MG/1
650 TABLET ORAL EVERY 6 HOURS
Status: DISCONTINUED | OUTPATIENT
Start: 2025-01-20 | End: 2025-01-21 | Stop reason: HOSPADM

## 2025-01-20 RX ORDER — ACETAMINOPHEN 325 MG/1
975 TABLET ORAL ONCE
Status: COMPLETED | OUTPATIENT
Start: 2025-01-20 | End: 2025-01-20

## 2025-01-20 RX ORDER — ONDANSETRON HYDROCHLORIDE 2 MG/ML
INJECTION, SOLUTION INTRAVENOUS AS NEEDED
Status: DISCONTINUED | OUTPATIENT
Start: 2025-01-20 | End: 2025-01-20

## 2025-01-20 RX ORDER — ONDANSETRON HYDROCHLORIDE 2 MG/ML
4 INJECTION, SOLUTION INTRAVENOUS EVERY 8 HOURS PRN
Status: DISCONTINUED | OUTPATIENT
Start: 2025-01-20 | End: 2025-01-21 | Stop reason: HOSPADM

## 2025-01-20 RX ORDER — MIDAZOLAM HYDROCHLORIDE 1 MG/ML
INJECTION, SOLUTION INTRAMUSCULAR; INTRAVENOUS AS NEEDED
Status: DISCONTINUED | OUTPATIENT
Start: 2025-01-20 | End: 2025-01-20

## 2025-01-20 RX ORDER — POLYETHYLENE GLYCOL 3350 17 G/17G
17 POWDER, FOR SOLUTION ORAL DAILY
Status: DISCONTINUED | OUTPATIENT
Start: 2025-01-21 | End: 2025-01-21 | Stop reason: HOSPADM

## 2025-01-20 RX ORDER — SODIUM CHLORIDE, SODIUM LACTATE, POTASSIUM CHLORIDE, CALCIUM CHLORIDE 600; 310; 30; 20 MG/100ML; MG/100ML; MG/100ML; MG/100ML
INJECTION, SOLUTION INTRAVENOUS CONTINUOUS PRN
Status: DISCONTINUED | OUTPATIENT
Start: 2025-01-20 | End: 2025-01-20

## 2025-01-20 RX ORDER — HEPARIN SODIUM 5000 [USP'U]/ML
5000 INJECTION, SOLUTION INTRAVENOUS; SUBCUTANEOUS ONCE
Status: COMPLETED | OUTPATIENT
Start: 2025-01-20 | End: 2025-01-20

## 2025-01-20 RX ORDER — LIDOCAINE HCL/PF 100 MG/5ML
SYRINGE (ML) INTRAVENOUS AS NEEDED
Status: DISCONTINUED | OUTPATIENT
Start: 2025-01-20 | End: 2025-01-20

## 2025-01-20 RX ORDER — FENTANYL CITRATE 50 UG/ML
INJECTION, SOLUTION INTRAMUSCULAR; INTRAVENOUS AS NEEDED
Status: DISCONTINUED | OUTPATIENT
Start: 2025-01-20 | End: 2025-01-20

## 2025-01-20 RX ORDER — LIDOCAINE HYDROCHLORIDE 10 MG/ML
0.1 INJECTION, SOLUTION INFILTRATION; PERINEURAL ONCE
Status: DISCONTINUED | OUTPATIENT
Start: 2025-01-20 | End: 2025-01-20 | Stop reason: HOSPADM

## 2025-01-20 RX ORDER — METHOCARBAMOL 500 MG/1
500 TABLET, FILM COATED ORAL EVERY 8 HOURS SCHEDULED
Status: DISCONTINUED | OUTPATIENT
Start: 2025-01-20 | End: 2025-01-21 | Stop reason: HOSPADM

## 2025-01-20 RX ORDER — ONDANSETRON HYDROCHLORIDE 2 MG/ML
4 INJECTION, SOLUTION INTRAVENOUS ONCE AS NEEDED
Status: DISCONTINUED | OUTPATIENT
Start: 2025-01-20 | End: 2025-01-20 | Stop reason: HOSPADM

## 2025-01-20 RX ORDER — FAMOTIDINE 20 MG/1
20 TABLET, FILM COATED ORAL 2 TIMES DAILY PRN
Status: DISCONTINUED | OUTPATIENT
Start: 2025-01-20 | End: 2025-01-21 | Stop reason: HOSPADM

## 2025-01-20 RX ORDER — ACETAMINOPHEN 650 MG/1
650 SUPPOSITORY RECTAL EVERY 6 HOURS
Status: DISCONTINUED | OUTPATIENT
Start: 2025-01-20 | End: 2025-01-21 | Stop reason: HOSPADM

## 2025-01-20 RX ORDER — OXYCODONE HYDROCHLORIDE 5 MG/1
5 TABLET ORAL EVERY 6 HOURS PRN
Status: DISCONTINUED | OUTPATIENT
Start: 2025-01-20 | End: 2025-01-21 | Stop reason: HOSPADM

## 2025-01-20 RX ORDER — PROCHLORPERAZINE MALEATE 10 MG
10 TABLET ORAL EVERY 6 HOURS PRN
Status: DISCONTINUED | OUTPATIENT
Start: 2025-01-20 | End: 2025-01-21 | Stop reason: HOSPADM

## 2025-01-20 RX ORDER — ALBUTEROL SULFATE 0.83 MG/ML
2.5 SOLUTION RESPIRATORY (INHALATION) ONCE AS NEEDED
Status: DISCONTINUED | OUTPATIENT
Start: 2025-01-20 | End: 2025-01-20 | Stop reason: HOSPADM

## 2025-01-20 RX ORDER — ONDANSETRON 4 MG/1
4 TABLET, ORALLY DISINTEGRATING ORAL EVERY 8 HOURS PRN
Status: DISCONTINUED | OUTPATIENT
Start: 2025-01-20 | End: 2025-01-21 | Stop reason: HOSPADM

## 2025-01-20 RX ORDER — ACETAMINOPHEN 325 MG/1
650 TABLET ORAL EVERY 4 HOURS PRN
Status: DISCONTINUED | OUTPATIENT
Start: 2025-01-20 | End: 2025-01-20 | Stop reason: HOSPADM

## 2025-01-20 RX ORDER — GABAPENTIN 300 MG/1
300 CAPSULE ORAL ONCE
Status: COMPLETED | OUTPATIENT
Start: 2025-01-20 | End: 2025-01-20

## 2025-01-20 RX ADMIN — ROCURONIUM BROMIDE 50 MG: 10 INJECTION, SOLUTION INTRAVENOUS at 07:38

## 2025-01-20 RX ADMIN — PROPOFOL 200 MG: 10 INJECTION, EMULSION INTRAVENOUS at 07:37

## 2025-01-20 RX ADMIN — METHOCARBAMOL 500 MG: 500 TABLET ORAL at 22:00

## 2025-01-20 RX ADMIN — HEPARIN SODIUM 5000 UNITS: 5000 INJECTION INTRAVENOUS; SUBCUTANEOUS at 07:04

## 2025-01-20 RX ADMIN — HYDROMORPHONE HYDROCHLORIDE 0.5 MG: 1 INJECTION, SOLUTION INTRAMUSCULAR; INTRAVENOUS; SUBCUTANEOUS at 08:11

## 2025-01-20 RX ADMIN — HYDROMORPHONE HYDROCHLORIDE 0.2 MG: 1 INJECTION, SOLUTION INTRAMUSCULAR; INTRAVENOUS; SUBCUTANEOUS at 11:54

## 2025-01-20 RX ADMIN — MIDAZOLAM 2 MG: 1 INJECTION INTRAMUSCULAR; INTRAVENOUS at 07:32

## 2025-01-20 RX ADMIN — CEFAZOLIN 2 G: 2 INJECTION, POWDER, FOR SOLUTION INTRAMUSCULAR; INTRAVENOUS at 07:41

## 2025-01-20 RX ADMIN — ROCURONIUM BROMIDE 20 MG: 10 INJECTION, SOLUTION INTRAVENOUS at 08:55

## 2025-01-20 RX ADMIN — GABAPENTIN 300 MG: 300 CAPSULE ORAL at 07:04

## 2025-01-20 RX ADMIN — FENTANYL CITRATE 50 MCG: 50 INJECTION, SOLUTION INTRAMUSCULAR; INTRAVENOUS at 07:40

## 2025-01-20 RX ADMIN — IBUPROFEN 600 MG: 600 TABLET, FILM COATED ORAL at 22:00

## 2025-01-20 RX ADMIN — SUGAMMADEX 400 MG: 100 INJECTION, SOLUTION INTRAVENOUS at 09:28

## 2025-01-20 RX ADMIN — LIDOCAINE HYDROCHLORIDE 60 MG: 20 INJECTION, SOLUTION INTRAVENOUS at 07:37

## 2025-01-20 RX ADMIN — FENTANYL CITRATE 50 MCG: 50 INJECTION, SOLUTION INTRAMUSCULAR; INTRAVENOUS at 07:37

## 2025-01-20 RX ADMIN — HYDROMORPHONE HYDROCHLORIDE 0.2 MG: 1 INJECTION, SOLUTION INTRAMUSCULAR; INTRAVENOUS; SUBCUTANEOUS at 16:19

## 2025-01-20 RX ADMIN — HYDROMORPHONE HYDROCHLORIDE 0.5 MG: 1 INJECTION, SOLUTION INTRAMUSCULAR; INTRAVENOUS; SUBCUTANEOUS at 09:06

## 2025-01-20 RX ADMIN — SODIUM CHLORIDE, SODIUM LACTATE, POTASSIUM CHLORIDE, AND CALCIUM CHLORIDE 75 ML/HR: 600; 310; 30; 20 INJECTION, SOLUTION INTRAVENOUS at 22:05

## 2025-01-20 RX ADMIN — FENTANYL CITRATE 50 MCG: 50 INJECTION, SOLUTION INTRAMUSCULAR; INTRAVENOUS at 07:45

## 2025-01-20 RX ADMIN — ONDANSETRON 4 MG: 2 INJECTION INTRAMUSCULAR; INTRAVENOUS at 09:20

## 2025-01-20 RX ADMIN — ENOXAPARIN SODIUM 40 MG: 40 INJECTION SUBCUTANEOUS at 22:01

## 2025-01-20 RX ADMIN — ACETAMINOPHEN 975 MG: 325 TABLET, FILM COATED ORAL at 07:04

## 2025-01-20 RX ADMIN — DEXAMETHASONE SODIUM PHOSPHATE 4 MG: 4 INJECTION, SOLUTION INTRAMUSCULAR; INTRAVENOUS at 07:42

## 2025-01-20 RX ADMIN — FENTANYL CITRATE 50 MCG: 50 INJECTION, SOLUTION INTRAMUSCULAR; INTRAVENOUS at 07:50

## 2025-01-20 RX ADMIN — GLUCAGON 1 MG: KIT at 08:49

## 2025-01-20 RX ADMIN — ROCURONIUM BROMIDE 30 MG: 10 INJECTION, SOLUTION INTRAVENOUS at 07:56

## 2025-01-20 RX ADMIN — ACETAMINOPHEN 650 MG: 325 TABLET, FILM COATED ORAL at 22:00

## 2025-01-20 RX ADMIN — SCOPOLAMINE 1 PATCH: 1.5 PATCH, EXTENDED RELEASE TRANSDERMAL at 07:04

## 2025-01-20 RX ADMIN — SODIUM CHLORIDE, SODIUM LACTATE, POTASSIUM CHLORIDE, AND CALCIUM CHLORIDE: 600; 310; 30; 20 INJECTION, SOLUTION INTRAVENOUS at 07:29

## 2025-01-20 SDOH — HEALTH STABILITY: MENTAL HEALTH: CURRENT SMOKER: 0

## 2025-01-20 ASSESSMENT — PAIN SCALES - GENERAL
PAINLEVEL_OUTOF10: 2
PAINLEVEL_OUTOF10: 0 - NO PAIN
PAINLEVEL_OUTOF10: 0 - NO PAIN
PAINLEVEL_OUTOF10: 2
PAINLEVEL_OUTOF10: 0 - NO PAIN
PAINLEVEL_OUTOF10: 0 - NO PAIN
PAINLEVEL_OUTOF10: 6
PAINLEVEL_OUTOF10: 0 - NO PAIN
PAINLEVEL_OUTOF10: 6
PAINLEVEL_OUTOF10: 0 - NO PAIN
PAINLEVEL_OUTOF10: 2
PAINLEVEL_OUTOF10: 2
PAINLEVEL_OUTOF10: 0 - NO PAIN
PAINLEVEL_OUTOF10: 0 - NO PAIN
PAINLEVEL_OUTOF10: 2
PAINLEVEL_OUTOF10: 0 - NO PAIN
PAINLEVEL_OUTOF10: 2
PAINLEVEL_OUTOF10: 0 - NO PAIN

## 2025-01-20 ASSESSMENT — COLUMBIA-SUICIDE SEVERITY RATING SCALE - C-SSRS
1. IN THE PAST MONTH, HAVE YOU WISHED YOU WERE DEAD OR WISHED YOU COULD GO TO SLEEP AND NOT WAKE UP?: NO
2. HAVE YOU ACTUALLY HAD ANY THOUGHTS OF KILLING YOURSELF?: NO
6. HAVE YOU EVER DONE ANYTHING, STARTED TO DO ANYTHING, OR PREPARED TO DO ANYTHING TO END YOUR LIFE?: NO

## 2025-01-20 ASSESSMENT — PAIN - FUNCTIONAL ASSESSMENT
PAIN_FUNCTIONAL_ASSESSMENT: 0-10
PAIN_FUNCTIONAL_ASSESSMENT: VAS (VISUAL ANALOG SCALE)

## 2025-01-20 ASSESSMENT — PAIN DESCRIPTION - LOCATION
LOCATION: ABDOMEN
LOCATION: ABDOMEN

## 2025-01-20 NOTE — ANESTHESIA PREPROCEDURE EVALUATION
Patient: Jossleyn Mcarthur    Procedure Information       Anesthesia Start Date/Time: 01/20/25 0729    Procedure: LAPAROSCOPIC CHOLECYSTECTOMY WITH OPERATIVE CHOLANGIOGRAMS & C-ARM/ POSSIBLE OPEN (Abdomen) - lap gilles with grams    Location: [facility] / [facility]    Surgeons: Juan Carolina MD            Relevant Problems   Anesthesia (within normal limits)      Cardiac   (+) Chronic hypertension      Pulmonary   (+) Mild asthma (HHS-HCC)      Liver   (+) Calculus of gallbladder without cholecystitis without obstruction      Hematology   (+) Anemia during pregnancy in third trimester (HHS-HCC)      GYN   (+) Supervision of other high risk pregnancy, antepartum (HHS-HCC)       Clinical information reviewed:    Allergies  Meds               NPO Detail:  NPO/Void Status  Date of Last Liquid: 01/20/25  Time of Last Liquid: 0430  Date of Last Solid: 01/19/25  Time of Last Solid: 2300         Physical Exam    Airway  Mallampati: II  TM distance: >3 FB  Neck ROM: full     Cardiovascular   Rhythm: regular  Rate: normal     Dental - normal exam     Pulmonary    Abdominal        Anesthesia Plan    History of general anesthesia?: yes  History of complications of general anesthesia?: no    ASA 2     general     The patient is not a current smoker.  Patient was not previously instructed to abstain from smoking on day of procedure.  Patient did not smoke on day of procedure.    intravenous induction   Anesthetic plan and risks discussed with patient.  Use of blood products discussed with patient who.    Plan discussed with CRNA.

## 2025-01-20 NOTE — ANESTHESIA POSTPROCEDURE EVALUATION
Patient: Josselyn Mcarthur    Procedure Summary       Date: 01/20/25 Room / Location: [facility] / [facility]    Anesthesia Start: 0729 Anesthesia Stop:     Procedure: LAPAROSCOPIC CHOLECYSTECTOMY WITH OPERATIVE CHOLANGIOGRAMS & C-ARM (Abdomen) Diagnosis:       Calculus of gallbladder without cholecystitis without obstruction      (Calculus of gallbladder without cholecystitis without obstruction [K80.20])    Surgeons: Juan Carolina MD Responsible Provider: Sukh Valencia MD    Anesthesia Type: general ASA Status: 2            Anesthesia Type: general    Vitals Value Taken Time   /91 01/20/25 0941   Temp 36.7 °C (98.1 °F) 01/20/25 0940   Pulse 100 01/20/25 0941   Resp 14 01/20/25 0940   SpO2 99 % 01/20/25 0941   Vitals shown include unfiled device data.    Anesthesia Post Evaluation    Patient location during evaluation: PACU  Patient participation: complete - patient participated  Level of consciousness: awake and alert  Pain management: adequate  Airway patency: patent  Cardiovascular status: acceptable  Respiratory status: acceptable  Hydration status: acceptable  Postoperative Nausea and Vomiting: none    No notable events documented.

## 2025-01-20 NOTE — ANESTHESIA PROCEDURE NOTES
Airway  Date/Time: 1/20/2025 7:39 AM  Urgency: elective    Airway not difficult    Staffing  Performed: CRNA   Authorized by: Sukh Valencia MD    Performed by: TATE Elliott-CRNA  Patient location during procedure: OR    Indications and Patient Condition  Indications for airway management: anesthesia  Spontaneous Ventilation: absent  Sedation level: deep  Preoxygenated: yes  Patient position: sniffing  Mask difficulty assessment: 1 - vent by mask  Planned trial extubation    Final Airway Details  Final airway type: endotracheal airway      Successful airway: ETT  Cuffed: yes   Successful intubation technique: direct laryngoscopy  Facilitating devices/methods: intubating stylet  Endotracheal tube insertion site: oral  Blade: Herman  Blade size: #4  ETT size (mm): 7.5  Cormack-Lehane Classification: grade IIa - partial view of glottis  Placement verified by: chest auscultation, capnometry and palpation of cuff   Cuff volume (mL): 7  Measured from: lips  ETT to lips (cm): 21  Number of attempts at approach: 1  Ventilation between attempts: none  Number of other approaches attempted: 0

## 2025-01-20 NOTE — OP NOTE
LAPAROSCOPIC CHOLECYSTECTOMY WITH OPERATIVE CHOLANGIOGRAMS & C-ARM Operative Note     Date: 2025  OR Location: [facility]    Name: Josselyn Mcarthur, : 1995, Age: 29 y.o., MRN: [id number], Sex: female    Diagnosis  Pre-op Diagnosis      * Calculus of gallbladder without cholecystitis without obstruction [K80.20] Post-op Diagnosis     * Calculus of gallbladder without cholecystitis without obstruction [K80.20]     Procedures  LAPAROSCOPIC CHOLECYSTECTOMY WITH OPERATIVE CHOLANGIOGRAMS & C-ARM  87219 - [place] LAPS SURG CHOLECYSTECTOMY W/CHOLANGIOGRAPHY      Surgeons      * Juan Carolina - Primary    Resident/Fellow/Other Assistant:  Surgeons and Role:  * No surgeons found with a matching role *    Staff:   Circulator: Skylar  Scrub Person: Luly  Surgical Assistant: Hawk Hemphill Person: Emmy  Surgical Assistant: Valdemar Watson Circulator: Xochilt    Anesthesia Staff: Anesthesiologist: Sukh Valencia MD  CRNA: TATE Elliott-CRNA    Procedure Summary  Anesthesia: General  ASA: II  Estimated Blood Loss: 20mL  Intra-op Medications:   Administrations occurring from 0730 to 1000 on 25:   Medication Name Total Dose   bupivacaine PF (Marcaine) 0.5 % (5 mg/mL) injection 20 mL   dexAMETHasone (Decadron) injection 4 mg/mL 4 mg   fentaNYL (Sublimaze) injection 50 mcg/mL 200 mcg   glucagon (Glucagen) injection (vial) 1 mg   HYDROmorphone (Dilaudid) injection 1 mg/mL 1 mg   lidocaine (cardiac) injection 2% prefilled syringe 60 mg   midazolam (Versed) injection 1 mg/mL 2 mg   ondansetron (Zofran) 2 mg/mL injection 4 mg   propofol (Diprivan) injection 10 mg/mL 200 mg   rocuronium (ZeMuron) 50 mg/5 mL injection 100 mg   sugammadex (Bridion) 200 mg/2 mL injection 400 mg   ceFAZolin (Ancef) 2 g in dextrose 5%  mL 2 g              Anesthesia Record               Intraprocedure I/O Totals       None           Specimen:   ID Type Source Tests Collected by Time   1 : GALLBLADDER Tissue GALLBLADDER CHOLECYSTECTOMY  SURGICAL PATHOLOGY EXAM Juan Carolina MD 1/20/2025 0916                 Drains and/or Catheters:   Closed/Suction Drain 1 Lateral RLQ Bulb 19 Fr. (Active)       Tourniquet Times:         Implants:     Findings: Minimal adhesions around gallbladder. Cholangiogram with retrograde filling and distal non-opacification of duodenum    Indications: Josselyn Mcarthur is an 29 y.o. female who is having surgery for Calculus of gallbladder without cholecystitis without obstruction [K80.20]. This patient had presented to the office with symptoms including several episodes of RUQ pain and elevated alk phos.  We discussed management options and cholecystectomy was ultimately recommended.    The patient was seen in the preoperative area. The risks, benefits, complications, treatment options, non-operative alternatives, expected recovery and outcomes were discussed with the patient. The possibilities of reaction to medication, pulmonary aspiration, injury to surrounding structures, bleeding, recurrent infection, the need for additional procedures, failure to diagnose a condition, and creating a complication requiring transfusion or operation were discussed with the patient. The patient concurred with the proposed plan, giving informed consent.  The site of surgery was properly noted/marked if necessary per policy. The patient has been actively warmed in preoperative area. Preoperative antibiotics have been ordered and given within 1 hours of incision. Venous thrombosis prophylaxis have been ordered including bilateral sequential compression devices and chemical prophylaxis.    Procedure Details:   The patient was brought to the operating room and anesthesia was induced uneventfully. Hair over the operative site was clipped. The skin was prepared using Chlorhexidine, which was allowed to dry for 3 minutes prior to draping.    The abdomen was accessed using a Damico technique at the umbilicus. 0 Vicryl stay sutures were placed on  the fascia and used to secure a 12mm Damico port. The abdomen was then insufflated to 15 mmHg using CO2.  After abdominal entry, there was no overt injury to any intra-abdominal organ or visible bleeding noted. Three additional 5 mm ports were placed in the standard locations for a laparoscopic cholecystectomy and the patient was placed in a head-up position. We performed a TAP block with 50cc of 0.5% marcaine divided equally between both sides of the patient's abdominal wall.    The gallbladder appeared minimally inflamed and there were no adhesions to surrounding structures. No adhesiolysis was required. The common bile duct was not visible. Once the gallbladder was mobilized, dissection began on the lateral aspect of the gallbladder to assist with retraction. Following this, medial dissection took place and then attention was turned towards isolating the cystic duct and cystic artery. A critical view of safety was obtained which included liberation of the lower 1/3 of the gallbladder from the cystic plate, the hepatocystic triangle being free of tissue, and two and only two structures entering the gallbladder corresponding to the cystic duct and cystic artery.     At this point, the cystic artery was clipped and divided. The cystic duct was clipped on the gallbladder side and partially transected. The cystic duct was of  above average  size and there was not return of bile when this was cut. A cholangiogram was performed by inserting an catheter into the cystic duct and injecting contrast while performing fluoroscopy. The cholangiogram was abnormal - contrast was able to traverse the cystic duct, was able to travel distally within the common bile duct, was not able to extrude into the duodenum, and was able to fill the hepatic ducts in a retrograde fashion. There was evidence of a filling defect secondary to non-opacification of the duodenum. Despite administration of 1mg of glucagon and waiting approximately 5  minutes, there was no progression of contrast seen even with repeat flushing. We discussed options - given the tortuous appearance of the cystic duct we felt that the likelihood of success at a laparoscopic common bile duct exploration would be low, and therefore we felt that an ERCP would be indicated. We left a 19 Fr drain in place under the liver.    Following this, the cystic duct was clipped distally twice, transected, and further secured with an endoloop. The gallbladder was then removed from the liver bed and placed in an endocatch bag. Irrigation was used to a small degree.    The abdomen was then inspected one last time. The cystic plate appeared hemostatic. The specimen were removed. Ports were removed under direct vision. Fascia at the 12mm port site was closed using the previously placed 0 Vicryl stay sutures. Skin was closed using 4-0 Monocryl suture.  Surgical glue was applied over the incisions. The patient was then awoken from anesthesia and the patient was transported to the recovery room in stable condition.    I did discuss need for ERCP and admission with the patient's boyfriend, as well as the on-call GI endoscopist Dr. Silva, and Dr. Li who will see the patient while she is admitted since [place] [facility].    Complications:  None; patient tolerated the procedure well.    Disposition: PACU - hemodynamically stable.  Condition: stable     Additional Details:   Please note that the assistance of Dr. Gaffney was necessary due to there being no adequately qualified resident available. He assisted with all aspects of the procedure.    Please note that the assistance of SA Arechiga was necessary for patient positioning and prep as well as camera driving.    Please note that the assistance of SA Aldrich was necessary for camera driving and assistance with wound closure.    Attending Attestation: I was present and scrubbed for the entire procedure.    Juan Carolina  Phone Number:  [phone]

## 2025-01-21 ENCOUNTER — PHARMACY VISIT (OUTPATIENT)
Dept: PHARMACY | Facility: CLINIC | Age: 30
End: 2025-01-21
Payer: MEDICAID

## 2025-01-21 ENCOUNTER — ANESTHESIA (OUTPATIENT)
Dept: GASTROENTEROLOGY | Facility: HOSPITAL | Age: 30
End: 2025-01-21
Payer: MEDICAID

## 2025-01-21 ENCOUNTER — HOSPITAL ENCOUNTER (OUTPATIENT)
Dept: RADIOLOGY | Facility: HOSPITAL | Age: 30
Discharge: HOME | End: 2025-01-21
Payer: MEDICAID

## 2025-01-21 ENCOUNTER — APPOINTMENT (OUTPATIENT)
Dept: GASTROENTEROLOGY | Facility: HOSPITAL | Age: 30
End: 2025-01-21
Payer: MEDICAID

## 2025-01-21 ENCOUNTER — ANESTHESIA EVENT (OUTPATIENT)
Dept: GASTROENTEROLOGY | Facility: HOSPITAL | Age: 30
End: 2025-01-21
Payer: MEDICAID

## 2025-01-21 VITALS
RESPIRATION RATE: 16 BRPM | SYSTOLIC BLOOD PRESSURE: 152 MMHG | HEART RATE: 60 BPM | OXYGEN SATURATION: 93 % | BODY MASS INDEX: 41.88 KG/M2 | TEMPERATURE: 97 F | WEIGHT: 260.58 LBS | DIASTOLIC BLOOD PRESSURE: 93 MMHG | HEIGHT: 66 IN

## 2025-01-21 VITALS
SYSTOLIC BLOOD PRESSURE: 152 MMHG | HEART RATE: 60 BPM | RESPIRATION RATE: 16 BRPM | DIASTOLIC BLOOD PRESSURE: 93 MMHG | TEMPERATURE: 97 F | OXYGEN SATURATION: 93 %

## 2025-01-21 PROBLEM — D64.9 ANEMIA: Status: ACTIVE | Noted: 2025-01-21

## 2025-01-21 LAB
ALBUMIN SERPL BCP-MCNC: 3 G/DL (ref 3.4–5)
ALP SERPL-CCNC: 33 U/L (ref 33–110)
ALT SERPL W P-5'-P-CCNC: 23 U/L (ref 7–45)
AST SERPL W P-5'-P-CCNC: 18 U/L (ref 9–39)
BASOPHILS # BLD AUTO: 0.01 X10*3/UL (ref 0–0.1)
BASOPHILS NFR BLD AUTO: 0.1 %
BILIRUB DIRECT SERPL-MCNC: 0.1 MG/DL (ref 0–0.3)
BILIRUB SERPL-MCNC: 0.5 MG/DL (ref 0–1.2)
EOSINOPHIL # BLD AUTO: 0.06 X10*3/UL (ref 0–0.7)
EOSINOPHIL NFR BLD AUTO: 0.9 %
ERYTHROCYTE [DISTWIDTH] IN BLOOD BY AUTOMATED COUNT: 14.5 % (ref 11.5–14.5)
FERRITIN SERPL-MCNC: 48 NG/ML (ref 8–150)
HCT VFR BLD AUTO: 30.4 % (ref 36–46)
HGB BLD-MCNC: 9.5 G/DL (ref 12–16)
IMM GRANULOCYTES # BLD AUTO: 0.03 X10*3/UL (ref 0–0.7)
IMM GRANULOCYTES NFR BLD AUTO: 0.4 % (ref 0–0.9)
IRON SATN MFR SERPL: 17 % (ref 25–45)
IRON SERPL-MCNC: 42 UG/DL (ref 35–150)
LYMPHOCYTES # BLD AUTO: 1.79 X10*3/UL (ref 1.2–4.8)
LYMPHOCYTES NFR BLD AUTO: 26.2 %
MCH RBC QN AUTO: 26.6 PG (ref 26–34)
MCHC RBC AUTO-ENTMCNC: 31.3 G/DL (ref 32–36)
MCV RBC AUTO: 85 FL (ref 80–100)
MONOCYTES # BLD AUTO: 0.63 X10*3/UL (ref 0.1–1)
MONOCYTES NFR BLD AUTO: 9.2 %
NEUTROPHILS # BLD AUTO: 4.32 X10*3/UL (ref 1.2–7.7)
NEUTROPHILS NFR BLD AUTO: 63.2 %
NRBC BLD-RTO: 0 /100 WBCS (ref 0–0)
PLATELET # BLD AUTO: 222 X10*3/UL (ref 150–450)
PROT SERPL-MCNC: 5.5 G/DL (ref 6.4–8.2)
RBC # BLD AUTO: 3.57 X10*6/UL (ref 4–5.2)
TIBC SERPL-MCNC: 241 UG/DL (ref 240–445)
UIBC SERPL-MCNC: 199 UG/DL (ref 110–370)
WBC # BLD AUTO: 6.8 X10*3/UL (ref 4.4–11.3)

## 2025-01-21 PROCEDURE — 2500000004 HC RX 250 GENERAL PHARMACY W/ HCPCS (ALT 636 FOR OP/ED): Performed by: STUDENT IN AN ORGANIZED HEALTH CARE EDUCATION/TRAINING PROGRAM

## 2025-01-21 PROCEDURE — 82728 ASSAY OF FERRITIN: CPT | Mod: PARLAB | Performed by: NURSE PRACTITIONER

## 2025-01-21 PROCEDURE — 2500000004 HC RX 250 GENERAL PHARMACY W/ HCPCS (ALT 636 FOR OP/ED): Performed by: NURSE ANESTHETIST, CERTIFIED REGISTERED

## 2025-01-21 PROCEDURE — 99254 IP/OBS CNSLTJ NEW/EST MOD 60: CPT | Performed by: NURSE PRACTITIONER

## 2025-01-21 PROCEDURE — 7100000001 HC RECOVERY ROOM TIME - INITIAL BASE CHARGE

## 2025-01-21 PROCEDURE — 2720000007 HC OR 272 NO HCPCS

## 2025-01-21 PROCEDURE — 3700000001 HC GENERAL ANESTHESIA TIME - INITIAL BASE CHARGE

## 2025-01-21 PROCEDURE — 83540 ASSAY OF IRON: CPT | Performed by: NURSE PRACTITIONER

## 2025-01-21 PROCEDURE — 80076 HEPATIC FUNCTION PANEL: CPT | Performed by: STUDENT IN AN ORGANIZED HEALTH CARE EDUCATION/TRAINING PROGRAM

## 2025-01-21 PROCEDURE — 74328 X-RAY BILE DUCT ENDOSCOPY: CPT | Performed by: INTERNAL MEDICINE

## 2025-01-21 PROCEDURE — 43262 ENDO CHOLANGIOPANCREATOGRAPH: CPT | Performed by: INTERNAL MEDICINE

## 2025-01-21 PROCEDURE — 43273 ENDOSCOPIC PANCREATOSCOPY: CPT | Performed by: INTERNAL MEDICINE

## 2025-01-21 PROCEDURE — 2500000005 HC RX 250 GENERAL PHARMACY W/O HCPCS: Performed by: INTERNAL MEDICINE

## 2025-01-21 PROCEDURE — C1769 GUIDE WIRE: HCPCS

## 2025-01-21 PROCEDURE — 96372 THER/PROPH/DIAG INJ SC/IM: CPT | Performed by: STUDENT IN AN ORGANIZED HEALTH CARE EDUCATION/TRAINING PROGRAM

## 2025-01-21 PROCEDURE — 2500000001 HC RX 250 WO HCPCS SELF ADMINISTERED DRUGS (ALT 637 FOR MEDICARE OP): Performed by: STUDENT IN AN ORGANIZED HEALTH CARE EDUCATION/TRAINING PROGRAM

## 2025-01-21 PROCEDURE — 43264 ERCP REMOVE DUCT CALCULI: CPT | Performed by: INTERNAL MEDICINE

## 2025-01-21 PROCEDURE — 2500000001 HC RX 250 WO HCPCS SELF ADMINISTERED DRUGS (ALT 637 FOR MEDICARE OP): Performed by: INTERNAL MEDICINE

## 2025-01-21 PROCEDURE — 2500000002 HC RX 250 W HCPCS SELF ADMINISTERED DRUGS (ALT 637 FOR MEDICARE OP, ALT 636 FOR OP/ED)

## 2025-01-21 PROCEDURE — 7100000011 HC EXTENDED STAY RECOVERY HOURLY - NURSING UNIT

## 2025-01-21 PROCEDURE — 3700000002 HC GENERAL ANESTHESIA TIME - EACH INCREMENTAL 1 MINUTE

## 2025-01-21 PROCEDURE — 36415 COLL VENOUS BLD VENIPUNCTURE: CPT | Performed by: STUDENT IN AN ORGANIZED HEALTH CARE EDUCATION/TRAINING PROGRAM

## 2025-01-21 PROCEDURE — 76000 FLUOROSCOPY <1 HR PHYS/QHP: CPT

## 2025-01-21 PROCEDURE — 85025 COMPLETE CBC W/AUTO DIFF WBC: CPT | Performed by: STUDENT IN AN ORGANIZED HEALTH CARE EDUCATION/TRAINING PROGRAM

## 2025-01-21 PROCEDURE — 7100000002 HC RECOVERY ROOM TIME - EACH INCREMENTAL 1 MINUTE

## 2025-01-21 PROCEDURE — 99024 POSTOP FOLLOW-UP VISIT: CPT | Performed by: SURGERY

## 2025-01-21 PROCEDURE — RXMED WILLOW AMBULATORY MEDICATION CHARGE

## 2025-01-21 RX ORDER — LIDOCAINE HYDROCHLORIDE 40 MG/ML
INJECTION, SOLUTION RETROBULBAR AS NEEDED
Status: DISCONTINUED | OUTPATIENT
Start: 2025-01-21 | End: 2025-01-21

## 2025-01-21 RX ORDER — INDOMETHACIN 100 MG
100 SUPPOSITORY, RECTAL RECTAL ONCE
Status: COMPLETED | OUTPATIENT
Start: 2025-01-21 | End: 2025-01-21

## 2025-01-21 RX ORDER — ONDANSETRON HYDROCHLORIDE 2 MG/ML
INJECTION, SOLUTION INTRAVENOUS AS NEEDED
Status: DISCONTINUED | OUTPATIENT
Start: 2025-01-21 | End: 2025-01-21

## 2025-01-21 RX ORDER — HYDROCODONE BITARTRATE AND ACETAMINOPHEN 5; 325 MG/1; MG/1
1 TABLET ORAL EVERY 6 HOURS PRN
Qty: 15 TABLET | Refills: 0 | Status: SHIPPED | OUTPATIENT
Start: 2025-01-21 | End: 2025-01-28

## 2025-01-21 RX ORDER — ROCURONIUM BROMIDE 10 MG/ML
INJECTION, SOLUTION INTRAVENOUS AS NEEDED
Status: DISCONTINUED | OUTPATIENT
Start: 2025-01-21 | End: 2025-01-21

## 2025-01-21 RX ORDER — MIDAZOLAM HYDROCHLORIDE 1 MG/ML
INJECTION, SOLUTION INTRAMUSCULAR; INTRAVENOUS AS NEEDED
Status: DISCONTINUED | OUTPATIENT
Start: 2025-01-21 | End: 2025-01-21

## 2025-01-21 RX ORDER — PROPOFOL 10 MG/ML
INJECTION, EMULSION INTRAVENOUS AS NEEDED
Status: DISCONTINUED | OUTPATIENT
Start: 2025-01-21 | End: 2025-01-21

## 2025-01-21 RX ORDER — FENTANYL CITRATE 50 UG/ML
INJECTION, SOLUTION INTRAMUSCULAR; INTRAVENOUS AS NEEDED
Status: DISCONTINUED | OUTPATIENT
Start: 2025-01-21 | End: 2025-01-21

## 2025-01-21 RX ORDER — IPRATROPIUM BROMIDE AND ALBUTEROL SULFATE 2.5; .5 MG/3ML; MG/3ML
SOLUTION RESPIRATORY (INHALATION)
Status: COMPLETED
Start: 2025-01-21 | End: 2025-01-21

## 2025-01-21 RX ADMIN — ACETAMINOPHEN 650 MG: 325 TABLET, FILM COATED ORAL at 04:37

## 2025-01-21 RX ADMIN — SUGAMMADEX 200 MG: 100 INJECTION, SOLUTION INTRAVENOUS at 14:35

## 2025-01-21 RX ADMIN — ENOXAPARIN SODIUM 40 MG: 40 INJECTION SUBCUTANEOUS at 09:16

## 2025-01-21 RX ADMIN — LIDOCAINE HYDROCHLORIDE 2 ML: 40 INJECTION, SOLUTION RETROBULBAR; TOPICAL at 13:58

## 2025-01-21 RX ADMIN — FENTANYL CITRATE 50 MCG: 50 INJECTION, SOLUTION INTRAMUSCULAR; INTRAVENOUS at 14:12

## 2025-01-21 RX ADMIN — ROCURONIUM BROMIDE 50 MG: 10 INJECTION, SOLUTION INTRAVENOUS at 13:59

## 2025-01-21 RX ADMIN — IPRATROPIUM BROMIDE AND ALBUTEROL SULFATE 3 ML: .5; 3 SOLUTION RESPIRATORY (INHALATION) at 15:23

## 2025-01-21 RX ADMIN — ACETAMINOPHEN 650 MG: 325 TABLET, FILM COATED ORAL at 09:16

## 2025-01-21 RX ADMIN — ACETAMINOPHEN 650 MG: 325 TABLET, FILM COATED ORAL at 16:18

## 2025-01-21 RX ADMIN — SODIUM CHLORIDE, SODIUM LACTATE, POTASSIUM CHLORIDE, AND CALCIUM CHLORIDE: 600; 310; 30; 20 INJECTION, SOLUTION INTRAVENOUS at 14:25

## 2025-01-21 RX ADMIN — FENTANYL CITRATE 50 MCG: 50 INJECTION, SOLUTION INTRAMUSCULAR; INTRAVENOUS at 13:58

## 2025-01-21 RX ADMIN — LIDOCAINE HYDROCHLORIDE 3 ML: 40 INJECTION, SOLUTION RETROBULBAR; TOPICAL at 14:01

## 2025-01-21 RX ADMIN — IBUPROFEN 600 MG: 600 TABLET, FILM COATED ORAL at 09:16

## 2025-01-21 RX ADMIN — METHOCARBAMOL 500 MG: 500 TABLET ORAL at 06:16

## 2025-01-21 RX ADMIN — ONDANSETRON 4 MG: 2 INJECTION INTRAMUSCULAR; INTRAVENOUS at 14:06

## 2025-01-21 RX ADMIN — Medication 100 MG: at 14:01

## 2025-01-21 RX ADMIN — DEXAMETHASONE SODIUM PHOSPHATE 4 MG: 4 INJECTION, SOLUTION INTRAMUSCULAR; INTRAVENOUS at 14:06

## 2025-01-21 RX ADMIN — IBUPROFEN 600 MG: 600 TABLET, FILM COATED ORAL at 16:18

## 2025-01-21 RX ADMIN — IBUPROFEN 600 MG: 600 TABLET, FILM COATED ORAL at 04:37

## 2025-01-21 RX ADMIN — SODIUM CHLORIDE, SODIUM LACTATE, POTASSIUM CHLORIDE, AND CALCIUM CHLORIDE 75 ML/HR: 600; 310; 30; 20 INJECTION, SOLUTION INTRAVENOUS at 16:18

## 2025-01-21 RX ADMIN — PROPOFOL 200 MG: 10 INJECTION, EMULSION INTRAVENOUS at 13:58

## 2025-01-21 RX ADMIN — MIDAZOLAM 2 MG: 1 INJECTION INTRAMUSCULAR; INTRAVENOUS at 13:58

## 2025-01-21 RX ADMIN — METHOCARBAMOL 500 MG: 500 TABLET ORAL at 16:18

## 2025-01-21 SDOH — SOCIAL STABILITY: SOCIAL INSECURITY
WITHIN THE LAST YEAR, HAVE YOU BEEN RAPED OR FORCED TO HAVE ANY KIND OF SEXUAL ACTIVITY BY YOUR PARTNER OR EX-PARTNER?: NO

## 2025-01-21 SDOH — SOCIAL STABILITY: SOCIAL INSECURITY: HAVE YOU HAD ANY THOUGHTS OF HARMING ANYONE ELSE?: NO

## 2025-01-21 SDOH — ECONOMIC STABILITY: FOOD INSECURITY: WITHIN THE PAST 12 MONTHS, THE FOOD YOU BOUGHT JUST DIDN'T LAST AND YOU DIDN'T HAVE MONEY TO GET MORE.: NEVER TRUE

## 2025-01-21 SDOH — SOCIAL STABILITY: SOCIAL INSECURITY
WITHIN THE LAST YEAR, HAVE YOU BEEN KICKED, HIT, SLAPPED, OR OTHERWISE PHYSICALLY HURT BY YOUR PARTNER OR EX-PARTNER?: NO

## 2025-01-21 SDOH — SOCIAL STABILITY: SOCIAL INSECURITY: WITHIN THE LAST YEAR, HAVE YOU BEEN HUMILIATED OR EMOTIONALLY ABUSED IN OTHER WAYS BY YOUR PARTNER OR EX-PARTNER?: NO

## 2025-01-21 SDOH — SOCIAL STABILITY: SOCIAL INSECURITY: DOES ANYONE TRY TO KEEP YOU FROM HAVING/CONTACTING OTHER FRIENDS OR DOING THINGS OUTSIDE YOUR HOME?: NO

## 2025-01-21 SDOH — SOCIAL STABILITY: SOCIAL INSECURITY: WITHIN THE LAST YEAR, HAVE YOU BEEN AFRAID OF YOUR PARTNER OR EX-PARTNER?: NO

## 2025-01-21 SDOH — ECONOMIC STABILITY: TRANSPORTATION INSECURITY: IN THE PAST 12 MONTHS, HAS LACK OF TRANSPORTATION KEPT YOU FROM MEDICAL APPOINTMENTS OR FROM GETTING MEDICATIONS?: NO

## 2025-01-21 SDOH — ECONOMIC STABILITY: FOOD INSECURITY: HOW HARD IS IT FOR YOU TO PAY FOR THE VERY BASICS LIKE FOOD, HOUSING, MEDICAL CARE, AND HEATING?: NOT VERY HARD

## 2025-01-21 SDOH — SOCIAL STABILITY: SOCIAL INSECURITY: HAVE YOU HAD THOUGHTS OF HARMING ANYONE ELSE?: NO

## 2025-01-21 SDOH — HEALTH STABILITY: MENTAL HEALTH: CURRENT SMOKER: 0

## 2025-01-21 SDOH — SOCIAL STABILITY: SOCIAL INSECURITY: DO YOU FEEL UNSAFE GOING BACK TO THE PLACE WHERE YOU ARE LIVING?: NO

## 2025-01-21 SDOH — SOCIAL STABILITY: SOCIAL INSECURITY: ARE YOU OR HAVE YOU BEEN THREATENED OR ABUSED PHYSICALLY, EMOTIONALLY, OR SEXUALLY BY ANYONE?: NO

## 2025-01-21 SDOH — ECONOMIC STABILITY: FOOD INSECURITY: WITHIN THE PAST 12 MONTHS, YOU WORRIED THAT YOUR FOOD WOULD RUN OUT BEFORE YOU GOT THE MONEY TO BUY MORE.: NEVER TRUE

## 2025-01-21 SDOH — SOCIAL STABILITY: SOCIAL INSECURITY: HAS ANYONE EVER THREATENED TO HURT YOUR FAMILY OR YOUR PETS?: NO

## 2025-01-21 SDOH — SOCIAL STABILITY: SOCIAL INSECURITY: ARE THERE ANY APPARENT SIGNS OF INJURIES/BEHAVIORS THAT COULD BE RELATED TO ABUSE/NEGLECT?: NO

## 2025-01-21 SDOH — SOCIAL STABILITY: SOCIAL INSECURITY: DO YOU FEEL ANYONE HAS EXPLOITED OR TAKEN ADVANTAGE OF YOU FINANCIALLY OR OF YOUR PERSONAL PROPERTY?: NO

## 2025-01-21 SDOH — SOCIAL STABILITY: SOCIAL INSECURITY: ABUSE: ADULT

## 2025-01-21 ASSESSMENT — ACTIVITIES OF DAILY LIVING (ADL)
FEEDING YOURSELF: INDEPENDENT
BATHING: INDEPENDENT
LACK_OF_TRANSPORTATION: NO
ADEQUATE_TO_COMPLETE_ADL: YES
PATIENT'S MEMORY ADEQUATE TO SAFELY COMPLETE DAILY ACTIVITIES?: YES
HEARING - LEFT EAR: FUNCTIONAL
WALKS IN HOME: INDEPENDENT
TOILETING: INDEPENDENT
DRESSING YOURSELF: INDEPENDENT
HEARING - RIGHT EAR: FUNCTIONAL
ASSISTIVE_DEVICE: EYEGLASSES
GROOMING: INDEPENDENT
JUDGMENT_ADEQUATE_SAFELY_COMPLETE_DAILY_ACTIVITIES: YES
LACK_OF_TRANSPORTATION: NO

## 2025-01-21 ASSESSMENT — LIFESTYLE VARIABLES
HOW OFTEN DO YOU HAVE A DRINK CONTAINING ALCOHOL: NEVER
HOW OFTEN DO YOU HAVE 6 OR MORE DRINKS ON ONE OCCASION: NEVER
AUDIT-C TOTAL SCORE: 0
SKIP TO QUESTIONS 9-10: 1
AUDIT-C TOTAL SCORE: 0
HOW MANY STANDARD DRINKS CONTAINING ALCOHOL DO YOU HAVE ON A TYPICAL DAY: PATIENT DOES NOT DRINK

## 2025-01-21 ASSESSMENT — COGNITIVE AND FUNCTIONAL STATUS - GENERAL
DAILY ACTIVITIY SCORE: 24
PATIENT BASELINE BEDBOUND: NO
MOBILITY SCORE: 24
DAILY ACTIVITIY SCORE: 24
MOBILITY SCORE: 24

## 2025-01-21 ASSESSMENT — PAIN SCALES - GENERAL
PAINLEVEL_OUTOF10: 0 - NO PAIN
PAIN_LEVEL: 0
PAINLEVEL_OUTOF10: 0 - NO PAIN
PAINLEVEL_OUTOF10: 6

## 2025-01-21 ASSESSMENT — PAIN - FUNCTIONAL ASSESSMENT
PAIN_FUNCTIONAL_ASSESSMENT: 0-10
PAIN_FUNCTIONAL_ASSESSMENT: 0-10

## 2025-01-21 ASSESSMENT — PATIENT HEALTH QUESTIONNAIRE - PHQ9
1. LITTLE INTEREST OR PLEASURE IN DOING THINGS: NOT AT ALL
SUM OF ALL RESPONSES TO PHQ9 QUESTIONS 1 & 2: 0
2. FEELING DOWN, DEPRESSED OR HOPELESS: NOT AT ALL

## 2025-01-21 NOTE — CARE PLAN
The patient's goals for the shift include      The clinical goals for the shift include comfort and safety      Problem: Pain - Adult  Goal: Verbalizes/displays adequate comfort level or baseline comfort level  Outcome: Progressing     Problem: Safety - Adult  Goal: Free from fall injury  Outcome: Progressing     Problem: Discharge Planning  Goal: Discharge to home or other facility with appropriate resources  Outcome: Progressing     Problem: Chronic Conditions and Co-morbidities  Goal: Patient's chronic conditions and co-morbidity symptoms are monitored and maintained or improved  Outcome: Progressing     Problem: Skin  Goal: Decreased wound size/increased tissue granulation at next dressing change  Outcome: Progressing  Goal: Participates in plan/prevention/treatment measures  Outcome: Progressing  Goal: Prevent/manage excess moisture  Outcome: Progressing  Goal: Prevent/minimize sheer/friction injuries  Outcome: Progressing  Goal: Promote/optimize nutrition  Outcome: Progressing  Goal: Promote skin healing  Outcome: Progressing

## 2025-01-21 NOTE — ANESTHESIA PROCEDURE NOTES
Airway  Date/Time: 1/21/2025 2:02 PM  Urgency: elective    Airway not difficult    Staffing  Performed: CRNA   Authorized by: Hiro Mar MD    Performed by: TATE Quintana-GIBRAN  Patient location during procedure: OR    Indications and Patient Condition  Indications for airway management: anesthesia  Spontaneous Ventilation: absent  Sedation level: deep  Preoxygenated: yes  Patient position: sniffing  Mask difficulty assessment: 1 - vent by mask    Final Airway Details  Final airway type: endotracheal airway      Successful airway: ETT  Cuffed: yes   Successful intubation technique: direct laryngoscopy  Facilitating devices/methods: intubating stylet  Endotracheal tube insertion site: oral  Blade: Herman  Blade size: #4  ETT size (mm): 7.5  Cormack-Lehane Classification: grade I - full view of glottis  Placement verified by: capnometry and palpation of cuff   Measured from: teeth  ETT to teeth (cm): 22  Number of attempts at approach: 1           Home med: Percocet 5/325 mg q 4h - continued  PDMP reviewed  Uses very sparingly per PCP note

## 2025-01-21 NOTE — CARE PLAN
The patient's goals for the shift include  rest    The clinical goals for the shift include  comfort and safety

## 2025-01-21 NOTE — PROGRESS NOTES
"Josselyn Mcarthur is a 29 y.o. female on day 0 of admission presenting with Calculus of gallbladder without cholecystitis without obstruction.    Subjective   Patient's pain is a 4/10. Tearful because she is hungry. Drain is very thin serosanguinous without bile. Labs are all WNL. Her baseline HGB is close to value presently.       Objective     Physical Exam  Constitutional:       Appearance: Normal appearance. She is obese.   HENT:      Head: Atraumatic.      Nose: Nose normal.      Mouth/Throat:      Mouth: Mucous membranes are moist.   Cardiovascular:      Rate and Rhythm: Normal rate and regular rhythm.   Pulmonary:      Effort: Pulmonary effort is normal.      Breath sounds: Normal breath sounds.   Abdominal:      General: There is no distension.      Palpations: Abdomen is soft.      Tenderness: There is no guarding or rebound.      Comments: Incisions CDI under glue. Drain SS.   Skin:     General: Skin is warm.   Neurological:      Mental Status: She is alert. Mental status is at baseline.   Psychiatric:         Mood and Affect: Mood normal.         Thought Content: Thought content normal.         Judgment: Judgment normal.         Last Recorded Vitals  Blood pressure 139/62, pulse 63, temperature 36.6 °C (97.9 °F), temperature source Temporal, resp. rate 18, height 1.676 m (5' 6\"), weight 118 kg (260 lb 9.3 oz), SpO2 98%, not currently breastfeeding.  Intake/Output last 3 Shifts:  I/O last 3 completed shifts:  In: 2792.9 (23.6 mL/kg) [I.V.:2792.9 (23.6 mL/kg)]  Out: 5 (0 mL/kg) [Drains:5]  Weight: 118.2 kg     Relevant Results  Results for orders placed or performed during the hospital encounter of 01/20/25 (from the past 24 hours)   CBC and Auto Differential   Result Value Ref Range    WBC 6.8 4.4 - 11.3 x10*3/uL    nRBC 0.0 0.0 - 0.0 /100 WBCs    RBC 3.57 (L) 4.00 - 5.20 x10*6/uL    Hemoglobin 9.5 (L) 12.0 - 16.0 g/dL    Hematocrit 30.4 (L) 36.0 - 46.0 %    MCV 85 80 - 100 fL    MCH 26.6 26.0 - 34.0 pg    " MCHC 31.3 (L) 32.0 - 36.0 g/dL    RDW 14.5 11.5 - 14.5 %    Platelets 222 150 - 450 x10*3/uL    Neutrophils % 63.2 40.0 - 80.0 %    Immature Granulocytes %, Automated 0.4 0.0 - 0.9 %    Lymphocytes % 26.2 13.0 - 44.0 %    Monocytes % 9.2 2.0 - 10.0 %    Eosinophils % 0.9 0.0 - 6.0 %    Basophils % 0.1 0.0 - 2.0 %    Neutrophils Absolute 4.32 1.20 - 7.70 x10*3/uL    Immature Granulocytes Absolute, Automated 0.03 0.00 - 0.70 x10*3/uL    Lymphocytes Absolute 1.79 1.20 - 4.80 x10*3/uL    Monocytes Absolute 0.63 0.10 - 1.00 x10*3/uL    Eosinophils Absolute 0.06 0.00 - 0.70 x10*3/uL    Basophils Absolute 0.01 0.00 - 0.10 x10*3/uL   Hepatic Function Panel   Result Value Ref Range    Albumin 3.0 (L) 3.4 - 5.0 g/dL    Bilirubin, Total 0.5 0.0 - 1.2 mg/dL    Bilirubin, Direct 0.1 0.0 - 0.3 mg/dL    Alkaline Phosphatase 33 33 - 110 U/L    ALT 23 7 - 45 U/L    AST 18 9 - 39 U/L    Total Protein 5.5 (L) 6.4 - 8.2 g/dL   Iron and TIBC   Result Value Ref Range    Iron 42 35 - 150 ug/dL    UIBC 199 110 - 370 ug/dL    TIBC 241 240 - 445 ug/dL    % Saturation 17 (L) 25 - 45 %           Assessment/Plan   Assessment & Plan  Calculus of gallbladder without cholecystitis without obstruction    S/P laparoscopic cholecystectomy    30 YO F BMI 42 with biliary colic, now s/p lap gilles with drain and occluded distal CBD on cholangiogram.    NPO for ERCP today.  If cannulation is not difficult anticipate DC this evening.  Otherwise discharge in AM after labs.  Remove OSCAR prior to DC.  Okay for diet after procedure.    I spent 35 minutes in the professional and overall care of this patient.      Tod Li MD PhD

## 2025-01-21 NOTE — CONSULTS
Reason For Consult  ERCP - post lap gilles/abnormal IOC    History Of Present Illness  Josselyn Mcarthur is a 29 y.o. female   with a history of HTN, mild asthma, GERD, and gallstones who underwent laparoscopic cholecystectomy with operative cholangiograms on 25 with Dr. Carolina.   IOC was positive for filling defect and GI service was asked for post-op ERCP.   On examination pt reports mild abdominal discomfort around surgical incisions.  Otherwise denies abdominal pain, epigastric pain, heartburn, nausea, vomiting or diarrhea.  Bowel habits daily or every other day  Occasional use of ibuprofen    Denies previous history of liver or gallbladder disorders    Patient he has 6 months old daughter    Reported an episode of nausea and vomited once on 1/10 evening. She states she had a normal breakfast and felt fine all day until about 7pm when she vomited. She denied any abdominal pain or diarrhea.     Blood work today shows H&H 9.5, 12.6 in December of last year but between upper 8-11 in [id number].  MCV 85.  CBC unremarkable, LFTs within normal limits.    From yesterday's op note intra operative cholangiogram abnormal, contrast unable to extrude into duodenum.    Past Medical History  She has a past medical history of Asthma, Cholelithiasis, Chronic hypertension, Gestational hypertension (HHS-HCC), and Obesity.    She has no past medical history of Abnormal Pap smear of cervix, Chlamydia, Gonorrhea, Hepatitis C, or Herpes.    Surgical History  She has a past surgical history that includes  section, low transverse (2022).     Social History  She reports that she has never smoked. She has never used smokeless tobacco. She reports that she does not currently use alcohol. She reports current drug use. Drug: Marijuana.    Family History  Family History   Problem Relation Name Age of Onset    Hypertension Mother      Depression Mother          Allergies  Patient has no known allergies.    Review of  "Systems    A 10 point review of system is negative except for what is mentioned in the HPI     Physical Exam    The note was created using voice recognition transcription software. Despite proofreading, unintentional typographical errors may be present. Please contact the GI office with any questions or concerns.     Current Medications: reviewed    Vital Signs: Reviewed    Physical Exam:  General: no apparent distress, pleasant and cooperative.  Obese  Skin:  Warm and dry, no jaundice  HEENT: No scleral icterus, no conjunctival pallor, normocephalic, atraumatic, mucous membranes moist  Neck:  atraumatic, trachea midline, no JVD  Chest:  decreased air entry to auscultation bilaterally. No wheezes, rales, or rhonchi  CV:  Regular rate and rhythm.  Positive S1/S2  Abdomen: no distension, +BS, soft, mildly tender to palpation around incisions, no rebound tenderness, no guarding, no rigidity, no discernible ascites.  OSCAR drain with small amount of mostly serous fluid  Extremities: no lower extremity edema, Chronic pigmentary changes, no cyanosis  Neurological:  A&Ox3 , no asterixis  Psychiatric: cooperative     Investigations:  Labs, radiological imaging and cardiac work up were reviewed    Last Recorded Vitals  Blood pressure 102/81, pulse 65, temperature 36.4 °C (97.5 °F), resp. rate 16, height 1.676 m (5' 6\"), weight 118 kg (260 lb 9.3 oz), SpO2 95%, not currently breastfeeding.    Relevant Results      Scheduled medications  acetaminophen, 650 mg, oral, q6h   Or  acetaminophen, 650 mg, nasogastric tube, q6h   Or  acetaminophen, 650 mg, rectal, q6h  enoxaparin, 40 mg, subcutaneous, q12h ROCIO  ibuprofen, 600 mg, oral, q6h  methocarbamol, 500 mg, oral, q8h ROCIO  polyethylene glycol, 17 g, oral, Daily      Continuous medications  lactated Ringer's, 75 mL/hr, Last Rate: Stopped (01/21/25 1802)      PRN medications  PRN medications: famotidine, ondansetron ODT **OR** ondansetron, oxyCODONE, prochlorperazine **OR** " prochlorperazine **OR** prochlorperazine    Results for orders placed or performed during the hospital encounter of 01/20/25 (from the past 24 hours)   CBC and Auto Differential   Result Value Ref Range    WBC 6.8 4.4 - 11.3 x10*3/uL    nRBC 0.0 0.0 - 0.0 /100 WBCs    RBC 3.57 (L) 4.00 - 5.20 x10*6/uL    Hemoglobin 9.5 (L) 12.0 - 16.0 g/dL    Hematocrit 30.4 (L) 36.0 - 46.0 %    MCV 85 80 - 100 fL    MCH 26.6 26.0 - 34.0 pg    MCHC 31.3 (L) 32.0 - 36.0 g/dL    RDW 14.5 11.5 - 14.5 %    Platelets 222 150 - 450 x10*3/uL    Neutrophils % 63.2 40.0 - 80.0 %    Immature Granulocytes %, Automated 0.4 0.0 - 0.9 %    Lymphocytes % 26.2 13.0 - 44.0 %    Monocytes % 9.2 2.0 - 10.0 %    Eosinophils % 0.9 0.0 - 6.0 %    Basophils % 0.1 0.0 - 2.0 %    Neutrophils Absolute 4.32 1.20 - 7.70 x10*3/uL    Immature Granulocytes Absolute, Automated 0.03 0.00 - 0.70 x10*3/uL    Lymphocytes Absolute 1.79 1.20 - 4.80 x10*3/uL    Monocytes Absolute 0.63 0.10 - 1.00 x10*3/uL    Eosinophils Absolute 0.06 0.00 - 0.70 x10*3/uL    Basophils Absolute 0.01 0.00 - 0.10 x10*3/uL   Hepatic Function Panel   Result Value Ref Range    Albumin 3.0 (L) 3.4 - 5.0 g/dL    Bilirubin, Total 0.5 0.0 - 1.2 mg/dL    Bilirubin, Direct 0.1 0.0 - 0.3 mg/dL    Alkaline Phosphatase 33 33 - 110 U/L    ALT 23 7 - 45 U/L    AST 18 9 - 39 U/L    Total Protein 5.5 (L) 6.4 - 8.2 g/dL   Iron and TIBC   Result Value Ref Range    Iron 42 35 - 150 ug/dL    UIBC 199 110 - 370 ug/dL    TIBC 241 240 - 445 ug/dL    % Saturation 17 (L) 25 - 45 %          Assessment/Plan     Josselyn Mcarthur is a 29 y.o. female   with a history of HTN, mild asthma, GERD, and gallstones who underwent laparoscopic cholecystectomy with operative cholangiograms on 1/20/25 with Dr. Carolina.   IOC was positive for filling defect and GI service was asked for post-op ERCP.     #Abnormal IOC  #Choledocholithiasis, suspected    N.p.o. today  ERCP today    Later in the day underwent ERCP with findings of long  tortuous remnant of cystic duct, no biliary ductal dilation or filling defects, sphincterotomy performed multiple CBD balloon sweeps performed, sludge removed, complete clearance achieved with restoration of good bile flow    Okay to start clear liquids diet and advance as tolerated.    Rest of medical management as per surgical team and primary medicine    Patient discussed with Dr. Sofia SEO to sign off.  Please do not hesitate to reconsult/reach out with questions should any arise    I spent 55 minutes in the professional and overall care of this patient.      Danna Austin, APRN-CNP

## 2025-01-21 NOTE — CARE PLAN
The patient's goals for the shift include  comfort and safety     The clinical goals for the shift include  comfort and safety

## 2025-01-21 NOTE — ANESTHESIA PREPROCEDURE EVALUATION
Patient: Josselyn Mcarthur    Procedure Information       Date/Time: 01/21/25 1300    Scheduled providers: Adrien Silva MD    Procedure: ERCP    Location: [facility]            Relevant Problems   Cardiac   (+) Chronic hypertension      Pulmonary   (+) Mild asthma (HHS-HCC)      Liver   (+) Calculus of gallbladder without cholecystitis without obstruction      Hematology   (+) Anemia during pregnancy in third trimester (HHS-HCC)      GYN   (+) Supervision of other high risk pregnancy, antepartum (HHS-HCC)       Clinical information reviewed:    Allergies  Meds  Problems              NPO Detail:  NPO/Void Status  Date of Last Liquid: 01/21/25  Time of Last Liquid: 1000  Date of Last Solid: 01/19/25  Time of Last Solid: 0000         Physical Exam    Airway  Mallampati: II  TM distance: <3 FB  Neck ROM: full     Cardiovascular - normal exam  Rhythm: regular  Rate: normal     Dental - normal exam     Pulmonary - normal exam     Abdominal   (+) obese             Anesthesia Plan    History of general anesthesia?: yes  History of complications of general anesthesia?: no    ASA 3     general     The patient is not a current smoker.    intravenous induction   Postoperative administration of opioids is intended.  Trial extubation is planned.  Anesthetic plan and risks discussed with patient.  Use of blood products discussed with patient who consented to blood products.    Plan discussed with CRNA.

## 2025-01-21 NOTE — DISCHARGE SUMMARY
Discharge Diagnosis  Calculus of gallbladder without cholecystitis without obstruction    Issues Requiring Follow-Up  None    Test Results Pending At Discharge  Pending Labs       Order Current Status    Ferritin In process    Surgical Pathology Exam In process            Hospital Course   Admitted 01/21/2025 following laparoscopic cholecystectomy with filling defect of distal CBD on cholangiogram. Normal labs in AM. ERCP performed by Dr. Silva with sludge, no pus or stent required. Sphincterotomy and balloon sweeps. Dr. Silva deemed her low risk for pancreatitis so she was diet trialed and discharged after drain removal (which was serous) on 01/21/2025 in the evening.    Pertinent Physical Exam At Time of Discharge  Physical Exam  Physical Exam  Constitutional:       Appearance: Normal appearance. She is obese.   HENT:      Head: Atraumatic.      Nose: Nose normal.      Mouth/Throat:      Mouth: Mucous membranes are moist.   Cardiovascular:      Rate and Rhythm: Normal rate and regular rhythm.   Pulmonary:      Effort: Pulmonary effort is normal.      Breath sounds: Normal breath sounds.   Abdominal:      General: There is no distension.      Palpations: Abdomen is soft.      Tenderness: There is no guarding or rebound.      Comments: Incisions CDI under glue. Drain SS.   Skin:     General: Skin is warm.   Neurological:      Mental Status: She is alert. Mental status is at baseline.   Psychiatric:         Mood and Affect: Mood normal.         Thought Content: Thought content normal.         Judgment: Judgment normal.     Home Medications     Medication List      START taking these medications     HYDROcodone-acetaminophen 5-325 mg tablet; Commonly known as: Norco;   Take 1 tablet by mouth every 6 hours if needed for severe pain (7 - 10)   for up to 12 doses.     CONTINUE taking these medications     famotidine 20 mg tablet; Commonly known as: Pepcid; Take 1 tablet (20   mg) by mouth 2 times a day as needed  (abdominal pain) for up to 15 days.       Outpatient Follow-Up  No future appointments.    Tod Li MD PhD

## 2025-01-21 NOTE — ANESTHESIA POSTPROCEDURE EVALUATION
Patient: Josselyn Mcarthur    Procedure Summary       Date: 01/21/25 Room / Location: [facility]    Anesthesia Start: 1353 Anesthesia Stop: 1449    Procedure: ERCP Diagnosis: Choledocholithiasis    Scheduled Providers: Adrien Silva MD Responsible Provider: Hiro Mar MD    Anesthesia Type: general ASA Status: 3            Anesthesia Type: general    Vitals Value Taken Time   /89 01/21/25 1447   Temp 36 °C (96.8 °F) 01/21/25 1445   Pulse 83 01/21/25 1459   Resp 26 01/21/25 1459   SpO2 91 % 01/21/25 1459   Vitals shown include unfiled device data.    Anesthesia Post Evaluation    Patient location during evaluation: PACU  Patient participation: waiting for patient participation  Level of consciousness: awake  Pain score: 0  Pain management: adequate  Airway patency: patent  Cardiovascular status: acceptable and hemodynamically stable  Respiratory status: face mask  Hydration status: acceptable  Postoperative Nausea and Vomiting: none        No notable events documented.

## 2025-01-30 LAB
ATRIAL RATE: 87 BPM
LABORATORY COMMENT REPORT: NORMAL
P AXIS: 88 DEGREES
P OFFSET: 182 MS
P ONSET: 158 MS
PATH REPORT.FINAL DX SPEC: NORMAL
PATH REPORT.GROSS SPEC: NORMAL
PATH REPORT.RELEVANT HX SPEC: NORMAL
PATH REPORT.TOTAL CANCER: NORMAL
PR INTERVAL: 120 MS
Q ONSET: 218 MS
QRS COUNT: 14 BEATS
QRS DURATION: 80 MS
QT INTERVAL: 372 MS
QTC CALCULATION(BAZETT): 447 MS
QTC FREDERICIA: 420 MS
R AXIS: 41 DEGREES
RESIDENT REVIEW: NORMAL
T AXIS: 19 DEGREES
T OFFSET: 404 MS
VENTRICULAR RATE: 87 BPM

## 2025-01-31 ENCOUNTER — APPOINTMENT (OUTPATIENT)
Dept: SURGERY | Facility: CLINIC | Age: 30
End: 2025-01-31
Payer: MEDICAID

## 2025-01-31 NOTE — PROGRESS NOTES
"Josselyn Mcarthur  [id number]  01/31/25  8:32 AM    HPI/Subjective:  Josselyn Mcarthur is a 29 y.o. female who presents for follow up after laparoscopic cholecystectomy on 1/20/2024.    Their immediate postoperative course was notable for filling defect of distal CCBD on cholangiogram.  She was observed overnight, labs and ERCP were normal and no stent ws required.  She was deemed low risk for pancreatitis, was able to tolerate a diet and she was discharged home on 1/21/24 after drain removal. Currently, they are doing {well, not well, poorly:70578}***.     They received the following number of postoperative pain prescription refills after surgery: {NUMBERS 0-5:24449::\"0\"}  They rate their satisfaction with their postoperative pain control as: {satisfaction:31329::\"satisfied\"}  They had the following number of visits to the emergency room/urgent care/clinic to address inadequate pain control: {NUMBERS 0-5:79263::\"0\"}    Concerns today include {cristobalconcerns:72778}.    Objective:      1/21/2025     2:47 PM 1/21/2025     3:00 PM 1/21/2025     3:15 PM 1/21/2025     3:30 PM 1/21/2025     3:45 PM 1/21/2025     4:07 PM 1/21/2025     4:08 PM   Vitals   Systolic 140 154 160 152 142 152 152   Diastolic 89 65 86 88 72 93 93   BP Location       Left arm   Heart Rate 93 80 58 53 64 60 60   Temp      36.1 °C (97 °F) 36.1 °C (97 °F)   Resp  18 23 23 18 16 16     Physical Exam  Vitals reviewed. Exam conducted with a chaperone present.   Constitutional:       General: She is not in acute distress.     Appearance: Normal appearance. She is not ill-appearing.   HENT:      Head: Normocephalic.      Mouth/Throat:      Mouth: Mucous membranes are moist.   Eyes:      Extraocular Movements: Extraocular movements intact.      Pupils: Pupils are equal, round, and reactive to light.   Cardiovascular:      Rate and Rhythm: Normal rate and regular rhythm.      Pulses: Normal pulses.      Heart sounds: Normal heart sounds. No murmur " "heard.  Pulmonary:      Effort: Pulmonary effort is normal. No respiratory distress.      Breath sounds: Normal breath sounds. No wheezing, rhonchi or rales.   Chest:      Chest wall: No tenderness.   Abdominal:      General: There is no distension.      Palpations: There is no mass.      Tenderness: There is no abdominal tenderness. There is no guarding or rebound.      Hernia: No hernia is present.      Comments: Healing incision   Musculoskeletal:         General: No swelling or deformity.      Cervical back: Neck supple. No rigidity.      Right lower leg: No edema.      Left lower leg: No edema.   Skin:     General: Skin is warm.      Coloration: Skin is not jaundiced or pale.   Neurological:      General: No focal deficit present.      Mental Status: She is alert and oriented to person, place, and time. Mental status is at baseline.   Psychiatric:         Mood and Affect: Mood normal.         Behavior: Behavior normal.         Thought Content: Thought content normal.         Judgment: Judgment normal.            Assessment/Plan:  Josselyn Mcarthur is a 29 y.o. female who presents for follow up after laparoscopic cholecystectomy on 1/20/2024. {findings; doing well postop:00998::\"Doing well postoperatively.\"}    From a physical activity standpoint, they may {sjztimetoactivities:99787::\"start to return to normal activities including lifting more than 15 pounds at 2 weeks postop, and may return to all activities at 6 weeks\"}.     Plan will be to see them again in {FOLLOW UP IN:23814}***, or sooner as needed.    Juan Carolina MD  Assistant Professor of Surgery  Division of General Surgery  Department of Surgery    "

## 2025-01-31 NOTE — PROGRESS NOTES
"Josselyn Mcarthur  [id number]  01/31/25  12:56 PM    HPI/Subjective:  Josselyn Mcarthur is a 29 y.o. female who presents for follow up after laparoscopic cholecystectomy on 1/20/2024.    Their immediate postoperative course was notable for filling defect of distal CCBD on cholangiogram.  She was observed overnight, labs and ERCP were normal and no stent ws required.  She was deemed low risk for pancreatitis, was able to tolerate a diet and she was discharged home on 1/21/24 after drain removal. Currently, they are doing {well, not well, poorly:69111}***.     They received the following number of postoperative pain prescription refills after surgery: {NUMBERS 0-5:18491::\"0\"}  They rate their satisfaction with their postoperative pain control as: {satisfaction:80240::\"satisfied\"}  They had the following number of visits to the emergency room/urgent care/clinic to address inadequate pain control: {NUMBERS 0-5:94449::\"0\"}    Concerns today include {cristobalconcerns:01592}.    Objective:      1/21/2025     2:47 PM 1/21/2025     3:00 PM 1/21/2025     3:15 PM 1/21/2025     3:30 PM 1/21/2025     3:45 PM 1/21/2025     4:07 PM 1/21/2025     4:08 PM   Vitals   Systolic 140 154 160 152 142 152 152   Diastolic 89 65 86 88 72 93 93   BP Location       Left arm   Heart Rate 93 80 58 53 64 60 60   Temp      36.1 °C (97 °F) 36.1 °C (97 °F)   Resp  18 23 23 18 16 16     Physical Exam  Vitals reviewed. Exam conducted with a chaperone present.   Constitutional:       General: She is not in acute distress.     Appearance: Normal appearance. She is not ill-appearing.   HENT:      Head: Normocephalic.      Mouth/Throat:      Mouth: Mucous membranes are moist.   Eyes:      Extraocular Movements: Extraocular movements intact.      Pupils: Pupils are equal, round, and reactive to light.   Cardiovascular:      Rate and Rhythm: Normal rate and regular rhythm.      Pulses: Normal pulses.      Heart sounds: Normal heart sounds. No murmur " "heard.  Pulmonary:      Effort: Pulmonary effort is normal. No respiratory distress.      Breath sounds: Normal breath sounds. No wheezing, rhonchi or rales.   Chest:      Chest wall: No tenderness.   Abdominal:      General: There is no distension.      Palpations: There is no mass.      Tenderness: There is no abdominal tenderness. There is no guarding or rebound.      Hernia: No hernia is present.      Comments: Healing incision   Musculoskeletal:         General: No swelling or deformity.      Cervical back: Neck supple. No rigidity.      Right lower leg: No edema.      Left lower leg: No edema.   Skin:     General: Skin is warm.      Coloration: Skin is not jaundiced or pale.   Neurological:      General: No focal deficit present.      Mental Status: She is alert and oriented to person, place, and time. Mental status is at baseline.   Psychiatric:         Mood and Affect: Mood normal.         Behavior: Behavior normal.         Thought Content: Thought content normal.         Judgment: Judgment normal.            Assessment/Plan:  Josselyn Mcarthur is a 29 y.o. female who presents for follow up after laparoscopic cholecystectomy on 1/20/2024. {findings; doing well postop:05841::\"Doing well postoperatively.\"}    From a physical activity standpoint, they may {sjztimetoactivities:63261::\"start to return to normal activities including lifting more than 15 pounds at 2 weeks postop, and may return to all activities at 6 weeks\"}.     Plan will be to see them again in {FOLLOW UP IN:91220}***, or sooner as needed.    Juan Carolina MD  Assistant Professor of Surgery  Division of General Surgery  Department of Surgery    "

## 2025-02-05 ENCOUNTER — APPOINTMENT (OUTPATIENT)
Dept: SURGERY | Facility: CLINIC | Age: 30
End: 2025-02-05
Payer: MEDICAID

## 2025-02-05 VITALS
BODY MASS INDEX: 41.97 KG/M2 | HEART RATE: 94 BPM | SYSTOLIC BLOOD PRESSURE: 127 MMHG | RESPIRATION RATE: 18 BRPM | WEIGHT: 260 LBS | DIASTOLIC BLOOD PRESSURE: 80 MMHG

## 2025-02-05 DIAGNOSIS — Z90.49 S/P LAPAROSCOPIC CHOLECYSTECTOMY: Primary | ICD-10-CM

## 2025-02-05 PROCEDURE — 99024 POSTOP FOLLOW-UP VISIT: CPT | Performed by: SURGERY

## 2025-02-05 PROCEDURE — 3079F DIAST BP 80-89 MM HG: CPT | Performed by: SURGERY

## 2025-02-05 PROCEDURE — 3074F SYST BP LT 130 MM HG: CPT | Performed by: SURGERY

## 2025-02-05 ASSESSMENT — PAIN SCALES - GENERAL: PAINLEVEL_OUTOF10: 0-NO PAIN

## 2025-02-05 NOTE — LETTER
February 5, 2025     Patient: Josselyn Mcarthur   YOB: 1995   Date of Visit: 2/5/2025       To Whom It May Concern:    It is my medical opinion that Josselyn Mcarthur may return to work on 2/7/2025 .    If you have any questions or concerns, please don't hesitate to call.         Sincerely,        Juan Carolina MD    CC: No Recipients

## (undated) DEVICE — TUBE SET, PNEUMOLAR HEATED, SMOKE EVACU, HIGH-FLOW

## (undated) DEVICE — SCISSOR TIP, ENDOCUT, LAPAROSCOPIC

## (undated) DEVICE — SUTURE, MONOCRYL, 2-0, 36 IN, CTX, VIOLET

## (undated) DEVICE — SUTURE, POLYSORB 0, 21 IN, LIGATING LOOP

## (undated) DEVICE — DRAPE PACK, CESAREAN SECTION, CUSTOM, UHC

## (undated) DEVICE — ELECTRODE, LAPAROSCOPY, L HOOK, 33CM, STERILE

## (undated) DEVICE — DRAIN, CHANNEL, HUBLESS, ROUND, FULL FLUTE, 19FR

## (undated) DEVICE — SUTURE, VICRYL, 0, 36 IN, CT, UNDYED

## (undated) DEVICE — GRASPER TIP, MODIFIED TRADITIONAL, 22.6MM

## (undated) DEVICE — SYRINGE, 50 CC, LUER LOCK

## (undated) DEVICE — RESERVOIR, DRAINAGE, WOUND, JACKSON-PRATT, 100 CC, SILICONE

## (undated) DEVICE — CLIP, ENDO APPLIER LIGAMAX 5MM

## (undated) DEVICE — SUTURE, PDS II, 0 36 IN, CT-1, VIOLET

## (undated) DEVICE — Device

## (undated) DEVICE — SOLUTION, INJECTION, CONTRAST, IOTHALAMATE MEGLUMINE 60%, CONRAY, 50 CC, VIAL

## (undated) DEVICE — CARE KIT, LAPAROSCOPIC, ADVANCED

## (undated) DEVICE — SUTURE, MONOCRYL PLUS, 4-0, PS-2 UD 27IN

## (undated) DEVICE — DISSECTOR, KITTNER, PEANUT, 5MM

## (undated) DEVICE — TOWEL PACK, STERILE, 4/PACK, BLUE

## (undated) DEVICE — CATHETER, IV, ANGIOCATH, 12 G X 3 IN, FEP POLYMER

## (undated) DEVICE — APPLICATOR, CHLORAPREP, W/ORANGE TINT, 26ML

## (undated) DEVICE — DRAIN, CHANNEL, ROUND, FULL FLUTE 19F

## (undated) DEVICE — TROCAR, OPTICAL, BLADELESS, 12MM, THREADED, 100MM LENGTH

## (undated) DEVICE — IRRIGATOR, HYDRO-SURG PLUS, WITH COJOINED SUCTION AND IRRIGATION

## (undated) DEVICE — CATHETER, CHOLANGIOGRAM, 4.5 FR, 45 CM, 18 IN

## (undated) DEVICE — CONNECTOR, IV, ONE-LINK, NEEDLE-FREE W/NEUTRAL FLUID DISPLACEMENT, LF

## (undated) DEVICE — CATHETER, IV, ANGIOCATH, 14 G X 5.25 IN, FEP POLYMER

## (undated) DEVICE — RETRIEVAL SYSTEM, MONARCH, 10MM DISP ENDOSCOPIC

## (undated) DEVICE — NEEDLE, HYPODERMIC, SAFETY, GLIDE, 18G X 1.5"

## (undated) DEVICE — SUTURE, VICRYL 0, 36 IN, CT-1, VIOLET

## (undated) DEVICE — NEEDLE, CLOSURE, OMNICLOSE

## (undated) DEVICE — TROCAR, KII OPTICAL BLADELESS 5MM Z THREAD 100MM LNGTH

## (undated) DEVICE — GLOVE, SURGICAL, PROTEXIS PI BLUE W/NEUTHERA, 6.0, PF, LF

## (undated) DEVICE — SLEEVE, KII, Z-THREAD, 5X100CM

## (undated) DEVICE — SLEEVE, VASO PRESS, CALF GARMENT, MEDIUM, GREEN

## (undated) DEVICE — GLOVE, SURGICAL, PROTEXIS PI MICRO, 6.0, PF, LF

## (undated) DEVICE — CATHETER, CHOLANGIOGRAM, 18 G X 11

## (undated) DEVICE — GRASPER, HUNTER TIP